# Patient Record
Sex: FEMALE | Race: BLACK OR AFRICAN AMERICAN | NOT HISPANIC OR LATINO | URBAN - METROPOLITAN AREA
[De-identification: names, ages, dates, MRNs, and addresses within clinical notes are randomized per-mention and may not be internally consistent; named-entity substitution may affect disease eponyms.]

---

## 2019-10-03 ENCOUNTER — EMERGENCY (EMERGENCY)
Facility: HOSPITAL | Age: 54
LOS: 0 days | Discharge: HOME | End: 2019-10-03
Attending: EMERGENCY MEDICINE
Payer: COMMERCIAL

## 2019-10-03 VITALS
HEART RATE: 61 BPM | SYSTOLIC BLOOD PRESSURE: 147 MMHG | TEMPERATURE: 99 F | OXYGEN SATURATION: 100 % | WEIGHT: 201.06 LBS | RESPIRATION RATE: 20 BRPM | HEIGHT: 65 IN | DIASTOLIC BLOOD PRESSURE: 100 MMHG

## 2019-10-03 DIAGNOSIS — M54.5 LOW BACK PAIN: ICD-10-CM

## 2019-10-03 DIAGNOSIS — S39.012A STRAIN OF MUSCLE, FASCIA AND TENDON OF LOWER BACK, INITIAL ENCOUNTER: ICD-10-CM
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
## 2019-10-03 LAB — HCG UR QL: NEGATIVE — SIGNIFICANT CHANGE UP

## 2019-10-03 PROCEDURE — 99284 EMERGENCY DEPT VISIT MOD MDM: CPT

## 2019-10-03 PROCEDURE — 72070 X-RAY EXAM THORAC SPINE 2VWS: CPT | Mod: 26

## 2019-10-03 PROCEDURE — 93010 ELECTROCARDIOGRAM REPORT: CPT

## 2019-10-03 PROCEDURE — 72100 X-RAY EXAM L-S SPINE 2/3 VWS: CPT | Mod: 26

## 2019-10-03 RX ORDER — CYCLOBENZAPRINE HYDROCHLORIDE 10 MG/1
1 TABLET, FILM COATED ORAL
Qty: 15 | Refills: 0
Start: 2019-10-03 | End: 2019-10-07

## 2019-10-03 RX ORDER — OXYCODONE AND ACETAMINOPHEN 5; 325 MG/1; MG/1
1 TABLET ORAL ONCE
Refills: 0 | Status: DISCONTINUED | OUTPATIENT
Start: 2019-10-03 | End: 2019-10-03

## 2019-10-03 RX ORDER — IBUPROFEN 200 MG
1 TABLET ORAL
Qty: 15 | Refills: 0
Start: 2019-10-03 | End: 2019-10-07

## 2019-10-03 RX ADMIN — OXYCODONE AND ACETAMINOPHEN 1 TABLET(S): 5; 325 TABLET ORAL at 10:47

## 2019-10-03 RX ADMIN — OXYCODONE AND ACETAMINOPHEN 1 TABLET(S): 5; 325 TABLET ORAL at 10:46

## 2019-10-03 NOTE — ED PROVIDER NOTE - PATIENT PORTAL LINK FT
You can access the FollowMyHealth Patient Portal offered by [facility] by registering at the following website: [email or web address]. By joining [employer]’s FollowMyHealth portal, you will also be able to view your health information using other applications (apps) compatible with our system.
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
## 2019-10-03 NOTE — ED PROVIDER NOTE - CONSTITUTIONAL, MLM
normal... Well appearing, well nourished, awake, alert, oriented to person, place, time/situation and in no apparent distress. Speaking in clear full sentences no nasal flaring no shoulders retractions no diaphoresis not holding her head/chest/abdomen, appears comfortable sitting up in the stretcher in a bright light room.

## 2019-10-03 NOTE — ED PROVIDER NOTE - PROGRESS NOTE DETAILS
Pt 's daughter is here to  pt. Pt sts she is feeling much better denies headache, sob, chest pain, nausea, vomiting, abd pain Pt 's daughter is here to  pt. Pt sts she is feeling much better denies headache, sob, chest pain, nausea, vomiting, abd pain. xray of thoracic and lumbar spines no acute fx. Pt is referred to spinal doctor for further management.

## 2019-10-03 NOTE — ED PROVIDER NOTE - NONTENDER LOCATION
umbilical/right costovertebral angle/left upper quadrant/right upper quadrant/suprapubic/left lower quadrant/right lower quadrant/periumbilical/left costovertebral angle

## 2019-10-03 NOTE — ED ADULT NURSE NOTE - NSIMPLEMENTINTERV_GEN_ALL_ED
Implemented All Universal Safety Interventions:  [place] to call system. Call bell, personal items and telephone within reach. Instruct patient to call for assistance. Room bathroom lighting operational. Non-slip footwear when patient is off stretcher. Physically safe environment: no spills, clutter or unnecessary equipment. Stretcher in lowest position, wheels locked, appropriate side rails in place.
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
## 2019-10-03 NOTE — ED ADULT TRIAGE NOTE - STATUS:
Telephone Encounter by Saman Bedolla MD at 01/16/18 03:21 PM     Author:  Saman Bedolla MD Service:  (none) Author Type:  Physician     Filed:  01/16/18 03:21 PM Encounter Date:  1/15/2018 Status:  Signed     :  Saman Bedolla MD (Physician)            Have her come in at 940 on Saturday[SS1.1M]      Revision History        User Key Date/Time User Provider Type Action    > SS1.1 01/16/18 03:21 PM Saman Bedolla MD Physician Sign    M - Manual             Applied
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
## 2019-10-25 ENCOUNTER — EMERGENCY (EMERGENCY)
Facility: HOSPITAL | Age: 54
LOS: 0 days | Discharge: HOME | End: 2019-10-25
Payer: COMMERCIAL

## 2019-10-25 VITALS
DIASTOLIC BLOOD PRESSURE: 87 MMHG | HEART RATE: 57 BPM | OXYGEN SATURATION: 98 % | RESPIRATION RATE: 17 BRPM | TEMPERATURE: 97 F | SYSTOLIC BLOOD PRESSURE: 137 MMHG

## 2019-10-25 VITALS
RESPIRATION RATE: 20 BRPM | WEIGHT: 190.04 LBS | TEMPERATURE: 99 F | SYSTOLIC BLOOD PRESSURE: 156 MMHG | HEART RATE: 69 BPM | HEIGHT: 66 IN | OXYGEN SATURATION: 100 % | DIASTOLIC BLOOD PRESSURE: 102 MMHG
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
## 2019-10-25 DIAGNOSIS — M25.562 PAIN IN LEFT KNEE: ICD-10-CM
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
## 2019-10-25 DIAGNOSIS — M54.5 LOW BACK PAIN: ICD-10-CM

## 2019-10-25 DIAGNOSIS — S16.1XXA STRAIN OF MUSCLE, FASCIA AND TENDON AT NECK LEVEL, INITIAL ENCOUNTER: ICD-10-CM

## 2019-10-25 DIAGNOSIS — M54.2 CERVICALGIA: ICD-10-CM

## 2019-10-25 DIAGNOSIS — M25.561 PAIN IN RIGHT KNEE: ICD-10-CM

## 2019-10-25 PROBLEM — Z78.9 OTHER SPECIFIED HEALTH STATUS: Chronic | Status: ACTIVE | Noted: 2019-10-03

## 2019-10-25 LAB
ALBUMIN SERPL ELPH-MCNC: 3.6 G/DL — SIGNIFICANT CHANGE UP (ref 3.3–5)
ALP SERPL-CCNC: 79 U/L — SIGNIFICANT CHANGE UP (ref 40–120)
ALT FLD-CCNC: 35 U/L — SIGNIFICANT CHANGE UP (ref 12–78)
ANION GAP SERPL CALC-SCNC: 5 MMOL/L — SIGNIFICANT CHANGE UP (ref 5–17)
AST SERPL-CCNC: 17 U/L — SIGNIFICANT CHANGE UP (ref 15–37)
BASOPHILS # BLD AUTO: 0.03 K/UL — SIGNIFICANT CHANGE UP (ref 0–0.2)
BASOPHILS NFR BLD AUTO: 0.6 % — SIGNIFICANT CHANGE UP (ref 0–2)
BILIRUB SERPL-MCNC: 0.4 MG/DL — SIGNIFICANT CHANGE UP (ref 0.2–1.2)
BUN SERPL-MCNC: 15 MG/DL — SIGNIFICANT CHANGE UP (ref 7–23)
CALCIUM SERPL-MCNC: 8.9 MG/DL — SIGNIFICANT CHANGE UP (ref 8.5–10.1)
CHLORIDE SERPL-SCNC: 107 MMOL/L — SIGNIFICANT CHANGE UP (ref 96–108)
CO2 SERPL-SCNC: 29 MMOL/L — SIGNIFICANT CHANGE UP (ref 22–31)
CREAT SERPL-MCNC: 0.89 MG/DL — SIGNIFICANT CHANGE UP (ref 0.5–1.3)
EOSINOPHIL # BLD AUTO: 0.15 K/UL — SIGNIFICANT CHANGE UP (ref 0–0.5)
EOSINOPHIL NFR BLD AUTO: 3.2 % — SIGNIFICANT CHANGE UP (ref 0–6)
GLUCOSE SERPL-MCNC: 111 MG/DL — HIGH (ref 70–99)
HCT VFR BLD CALC: 36.2 % — SIGNIFICANT CHANGE UP (ref 34.5–45)
HGB BLD-MCNC: 11.4 G/DL — LOW (ref 11.5–15.5)
IMM GRANULOCYTES NFR BLD AUTO: 0.6 % — SIGNIFICANT CHANGE UP (ref 0–1.5)
LYMPHOCYTES # BLD AUTO: 1.94 K/UL — SIGNIFICANT CHANGE UP (ref 1–3.3)
LYMPHOCYTES # BLD AUTO: 41.9 % — SIGNIFICANT CHANGE UP (ref 13–44)
MCHC RBC-ENTMCNC: 26.6 PG — LOW (ref 27–34)
MCHC RBC-ENTMCNC: 31.5 GM/DL — LOW (ref 32–36)
MCV RBC AUTO: 84.4 FL — SIGNIFICANT CHANGE UP (ref 80–100)
MONOCYTES # BLD AUTO: 0.44 K/UL — SIGNIFICANT CHANGE UP (ref 0–0.9)
MONOCYTES NFR BLD AUTO: 9.5 % — SIGNIFICANT CHANGE UP (ref 2–14)
NEUTROPHILS # BLD AUTO: 2.04 K/UL — SIGNIFICANT CHANGE UP (ref 1.8–7.4)
NEUTROPHILS NFR BLD AUTO: 44.2 % — SIGNIFICANT CHANGE UP (ref 43–77)
NRBC # BLD: 0 /100 WBCS — SIGNIFICANT CHANGE UP (ref 0–0)
PLATELET # BLD AUTO: 253 K/UL — SIGNIFICANT CHANGE UP (ref 150–400)
POTASSIUM SERPL-MCNC: 3.5 MMOL/L — SIGNIFICANT CHANGE UP (ref 3.5–5.3)
POTASSIUM SERPL-SCNC: 3.5 MMOL/L — SIGNIFICANT CHANGE UP (ref 3.5–5.3)
PROT SERPL-MCNC: 7.3 GM/DL — SIGNIFICANT CHANGE UP (ref 6–8.3)
RBC # BLD: 4.29 M/UL — SIGNIFICANT CHANGE UP (ref 3.8–5.2)
RBC # FLD: 14.6 % — HIGH (ref 10.3–14.5)
SODIUM SERPL-SCNC: 141 MMOL/L — SIGNIFICANT CHANGE UP (ref 135–145)
TROPONIN I SERPL-MCNC: <.015 NG/ML — SIGNIFICANT CHANGE UP (ref 0.01–0.04)
TROPONIN I SERPL-MCNC: <.015 NG/ML — SIGNIFICANT CHANGE UP (ref 0.01–0.04)
WBC # BLD: 4.63 K/UL — SIGNIFICANT CHANGE UP (ref 3.8–10.5)
WBC # FLD AUTO: 4.63 K/UL — SIGNIFICANT CHANGE UP (ref 3.8–10.5)

## 2019-10-25 PROCEDURE — 71046 X-RAY EXAM CHEST 2 VIEWS: CPT | Mod: 26

## 2019-10-25 PROCEDURE — 73562 X-RAY EXAM OF KNEE 3: CPT | Mod: 26,50

## 2019-10-25 PROCEDURE — 72170 X-RAY EXAM OF PELVIS: CPT | Mod: 26

## 2019-10-25 PROCEDURE — 72125 CT NECK SPINE W/O DYE: CPT | Mod: 26

## 2019-10-25 PROCEDURE — 76377 3D RENDER W/INTRP POSTPROCES: CPT | Mod: 26

## 2019-10-25 PROCEDURE — 70450 CT HEAD/BRAIN W/O DYE: CPT | Mod: 26

## 2019-10-25 PROCEDURE — 93010 ELECTROCARDIOGRAM REPORT: CPT

## 2019-10-25 PROCEDURE — 99285 EMERGENCY DEPT VISIT HI MDM: CPT

## 2019-10-25 RX ORDER — IBUPROFEN 200 MG
600 TABLET ORAL ONCE
Refills: 0 | Status: COMPLETED | OUTPATIENT
Start: 2019-10-25 | End: 2019-10-25

## 2019-10-25 RX ORDER — TIZANIDINE 4 MG/1
1 TABLET ORAL
Qty: 18 | Refills: 0
Start: 2019-10-25 | End: 2019-10-30

## 2019-10-25 RX ORDER — ACETAMINOPHEN 500 MG
975 TABLET ORAL ONCE
Refills: 0 | Status: COMPLETED | OUTPATIENT
Start: 2019-10-25 | End: 2019-10-25

## 2019-10-25 RX ORDER — METHOCARBAMOL 500 MG/1
1000 TABLET, FILM COATED ORAL ONCE
Refills: 0 | Status: COMPLETED | OUTPATIENT
Start: 2019-10-25 | End: 2019-10-25

## 2019-10-25 RX ADMIN — Medication 975 MILLIGRAM(S): at 14:50

## 2019-10-25 RX ADMIN — Medication 975 MILLIGRAM(S): at 14:21

## 2019-10-25 RX ADMIN — Medication 600 MILLIGRAM(S): at 18:35

## 2019-10-25 NOTE — ED PROVIDER NOTE - PHYSICAL EXAMINATION
Physical Exam    Vital Signs: I have reviewed the initial vital signs.  Constitutional: well-nourished, appears stated age, no acute distress  Eyes: PERRLA,  and symmetrical lids.  ENT: Neck supple with no adenopathy, moist MM.  Cardiovascular: regular rate, regular rhythm, well-perfused extremities, no chest wall bruising or ttp  Respiratory: unlabored respiratory effort, clear to auscultation bilaterally  Gastrointestinal: soft, non-tender abdomen, no pulsatile mass, no abd bruising or seatbelt signs or ttp  Musculoskeletal: A&OX3. No visible bruises or distracting injuries. No midline ttp; TTP; full rotation, flexion, and extension of the neck. Good radial pulses +2 b/l, no sesnory or motor deficit in the UE.  Integumentary: warm, dry, no rash  Neurologic: awake, alert, cranial nerves II-XII grossly intact, extremities’ motor and sensory functions grossly intact, no ataxia or dysemtria  Psychiatric: A&Ox3
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
## 2019-10-25 NOTE — ED PROVIDER NOTE - NS ED ROS FT
Review of Systems    Constitutional: (-) fever  Eyes/ENT: (-) change in vision, (-) sore throat, (-) ear pain  Cardiovascular: (-) palpitation   Respiratory: (-) cough, (-) shortness of breath  Gastrointestinal: (-) abdominal pain (-) vomiting, (-) diarrhea  Musculoskeletal: (-) neck pain, (-) back pain, (-) joint pain  Integumentary: (-) rash, (-) edema  Neurological: (-) headache, (-) altered mental status  Heme/Lymph: (-) easy bruising (-) easy bleeding  Allergic/Immunologic: (-) pruritus

## 2019-10-25 NOTE — ED PROVIDER NOTE - CARE PROVIDER_API CALL
Chandrakant Hager)  Cardiology; Internal Medicine; Nuclear Cardiology  [address] [place] [address]  Phone: [phone]  Fax: [phone]  Follow Up Time:

## 2019-10-25 NOTE — ED PROVIDER NOTE - PROGRESS NOTE DETAILS
Pt and family aware of results and CXR findings. Pt CT (-), C-colar cleared. Pt is chest pain free ready for discharge will follow up with cardiology, pt family aware of the results.

## 2019-10-25 NOTE — ED PROVIDER NOTE - PATIENT PORTAL LINK FT
You can access the FollowMyHealth Patient Portal offered by [facility] by registering at the following website: [email or web address]. By joining [employer]’s FollowMyHealth portal, you will also be able to view your health information using other applications (apps) compatible with our system.

## 2019-10-25 NOTE — ED PROVIDER NOTE - CLINICAL SUMMARY MEDICAL DECISION MAKING FREE TEXT BOX
Pt pw neck pain with a negative CT, C spine cleared, tropx2 neg, HEART SCORE 1. Symptoms releived with tylenol and NSAIDs. Pt ready for discharge will follow up with cardiology.
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
## 2021-08-22 ENCOUNTER — TRANSCRIPTION ENCOUNTER (OUTPATIENT)
Age: 56
End: 2021-08-22

## 2024-03-14 ENCOUNTER — INPATIENT (INPATIENT)
Facility: HOSPITAL | Age: 59
LOS: 10 days | Discharge: NURSING, REHAB OR LONG-TERM CARE | End: 2024-03-25
Attending: STUDENT IN AN ORGANIZED HEALTH CARE EDUCATION/TRAINING PROGRAM | Admitting: STUDENT IN AN ORGANIZED HEALTH CARE EDUCATION/TRAINING PROGRAM
Payer: COMMERCIAL

## 2024-03-14 VITALS
DIASTOLIC BLOOD PRESSURE: 60 MMHG | OXYGEN SATURATION: 100 % | RESPIRATION RATE: 16 BRPM | HEART RATE: 91 BPM | TEMPERATURE: 98 F | SYSTOLIC BLOOD PRESSURE: 90 MMHG

## 2024-03-14 PROCEDURE — 99285 EMERGENCY DEPT VISIT HI MDM: CPT

## 2024-03-14 RX ORDER — SODIUM CHLORIDE 9 MG/ML
1000 INJECTION INTRAMUSCULAR; INTRAVENOUS; SUBCUTANEOUS ONCE
Refills: 0 | Status: COMPLETED | OUTPATIENT
Start: 2024-03-14 | End: 2024-03-14

## 2024-03-14 RX ORDER — ONDANSETRON 8 MG/1
4 TABLET, FILM COATED ORAL ONCE
Refills: 0 | Status: COMPLETED | OUTPATIENT
Start: 2024-03-14 | End: 2024-03-14

## 2024-03-14 RX ADMIN — SODIUM CHLORIDE 1000 MILLILITER(S): 9 INJECTION INTRAMUSCULAR; INTRAVENOUS; SUBCUTANEOUS at 23:35

## 2024-03-14 RX ADMIN — ONDANSETRON 4 MILLIGRAM(S): 8 TABLET, FILM COATED ORAL at 23:34

## 2024-03-14 NOTE — ED PROVIDER NOTE - PROGRESS NOTE DETAILS
Sarthak, PGY3 - Received signout on patient.  58-year-old woman with PMH spinal cord injury that has resulted in paraplegia, presenting due to nausea and vomiting and unknown abdominal pain due to patient not being able to feel sensation at this level.  Has also been feeling weak, new left leg swelling despite being on Lovenox.  Waiting on labs, CAT scan, ultrasound of her leg, disposition to be reassessed after workup complete. Sarthak, PGY3 - Lab called due to hemoglobin being low however they state that the sample appears to be diluted.  Will resend another sample. Patient signed out to me, complaining of some neck pain, no midline tenderness to palpation at this time.  Able to range the neck, no meningismus.  Patient denies any black stools or bloody stools, but pt states that she has needed transfusions in the past. Jaison Jaime, ED Attending Sarthak, PGY3 - Blood transfusion benefits, risks, and alternatives explained to the patient and daughter at bedside, patient is in agreement. Consent form has been signed, witnessed, and placed in chart. prbcs ordered, will consult surgery due to concern for thigh hematoma Spoke to surgery, not a surgical candidate, recommended further imaging to assess for active bleed.  But patient will likely need IR intervention if active bleed seen.  Patient hemodynamically stable.  Patient and patient's daughter updated on all findings.  Plan for CT angio of the abdomen pelvis with runoffs to the thigh to assess for possible source of hematoma. Jaison Jaime, ED Attending

## 2024-03-14 NOTE — ED PROVIDER NOTE - CARE PLAN
1 Principal Discharge DX:	Anemia due to acute blood loss  Secondary Diagnosis:	Intramuscular hematoma

## 2024-03-14 NOTE — ED PROVIDER NOTE - OBJECTIVE STATEMENT
58 year old female with recent spinal cord injury dec 23, presents with 1 day of vomiting. patient unable to tell if she has abd pain 2/2 injury. also complains of being more sleepy than normal and having left leg swelling that is new. has history of thrombectomy in left leg and is on daily lovenox. patient also suffers from autonomic dysreflexia.  no fevers. patient can not tell if she has dysuria.

## 2024-03-14 NOTE — ED ADULT TRIAGE NOTE - CHIEF COMPLAINT QUOTE
PT c/o weakness, nausea, vomiting x 1 day. pt states she hasn't been able to keep anything down. Pt is bed bound due to a spinal cord inj, unable to move extremities. . Pt has a healing sacral ulcer. pt noted to be hypotensive. No complaints of chest pain, headache, dizziness, SOB, fever, chills verbalized..

## 2024-03-14 NOTE — ED ADULT NURSE NOTE - OBJECTIVE STATEMENT
Patient received in ED room 2. A&Ox4 and bedbound at baseline secondary to spinal cord injury. C/o fatigue, decreased PO intake, nausea and vomiting x 1 day. Pt appears lethargic in stretcher. Placed on bedside cardiac monitor NSR. O2 saturation is 99% on room air. Soft Blood Pressure noted and charted in flowsheet, MD Muniz made aware. Respirations even and unlabored with equal chest rise. No acute distress noted. Speaking in full and complete sentences. MD Muniz made aware of USIV needed after two RN attempts. Daughter at bedside. Bed in lowest position, call bell in reach, wheels locked, appropriate side rails up, fall precautions in effect, safety maintained. Awaiting US and CT. Patient received in ED room 2. A&Ox4 and bedbound at baseline secondary to spinal cord injury. C/o fatigue, decreased PO intake, nausea and vomiting x 1 day. Pt appears lethargic in stretcher. Placed on bedside cardiac monitor NSR. O2 saturation is 99% on room air. Soft Blood Pressure noted and charted in flowsheet, MD Muniz made aware. Respirations even and unlabored with equal chest rise. No acute distress noted. Speaking in full and complete sentences. Stage 1 sacral ulcer noted. MD Muniz made aware of USIV needed after two RN attempts. Daughter at bedside. Bed in lowest position, call bell in reach, wheels locked, appropriate side rails up, fall precautions in effect, safety maintained. Awaiting US and CT.

## 2024-03-14 NOTE — ED PROVIDER NOTE - CLINICAL SUMMARY MEDICAL DECISION MAKING FREE TEXT BOX
Will obtain cbc to rule out anemia. Will obtain CMP to rule out electrolyte abnormalities, liver failure or renal failure. fluids. anti emetics. ct for obstruction given patient unable to tell if she has belly pain. ua for uti. left leg duplex for dvt. ct head for ich or subdural. reassess

## 2024-03-14 NOTE — ED ADULT NURSE NOTE - NSFALLHARMRISKINTERV_ED_ALL_ED
Assistance OOB with selected safe patient handling equipment if applicable/Assistance with ambulation/Communicate risk of Fall with Harm to all staff, patient, and family/Monitor gait and stability/Provide visual cue: red socks, yellow wristband, yellow gown, etc/Reinforce activity limits and safety measures with patient and family/Toileting schedule using arm’s reach rule for commode and bathroom/Bed in lowest position, wheels locked, appropriate side rails in place/Call bell, personal items and telephone in reach/Instruct patient to call for assistance before getting out of bed/chair/stretcher/Non-slip footwear applied when patient is off stretcher/[place] to call system/Physically safe environment - no spills, clutter or unnecessary equipment/Purposeful Proactive Rounding/Room/bathroom lighting operational, light cord in reach

## 2024-03-15 DIAGNOSIS — D62 ACUTE POSTHEMORRHAGIC ANEMIA: ICD-10-CM

## 2024-03-15 DIAGNOSIS — G82.20 PARAPLEGIA, UNSPECIFIED: ICD-10-CM

## 2024-03-15 DIAGNOSIS — T14.8XXA OTHER INJURY OF UNSPECIFIED BODY REGION, INITIAL ENCOUNTER: ICD-10-CM

## 2024-03-15 DIAGNOSIS — I82.409 ACUTE EMBOLISM AND THROMBOSIS OF UNSPECIFIED DEEP VEINS OF UNSPECIFIED LOWER EXTREMITY: ICD-10-CM

## 2024-03-15 DIAGNOSIS — D50.0 IRON DEFICIENCY ANEMIA SECONDARY TO BLOOD LOSS (CHRONIC): ICD-10-CM

## 2024-03-15 DIAGNOSIS — Z86.718 PERSONAL HISTORY OF OTHER VENOUS THROMBOSIS AND EMBOLISM: ICD-10-CM

## 2024-03-15 DIAGNOSIS — Z29.9 ENCOUNTER FOR PROPHYLACTIC MEASURES, UNSPECIFIED: ICD-10-CM

## 2024-03-15 LAB
A1C WITH ESTIMATED AVERAGE GLUCOSE RESULT: 5.2 % — SIGNIFICANT CHANGE UP (ref 4–5.6)
A1C WITH ESTIMATED AVERAGE GLUCOSE RESULT: 5.3 % — SIGNIFICANT CHANGE UP (ref 4–5.6)
ADD ON TEST-SPECIMEN IN LAB: SIGNIFICANT CHANGE UP
ALBUMIN SERPL ELPH-MCNC: 3.2 G/DL — LOW (ref 3.3–5)
ALBUMIN SERPL ELPH-MCNC: 3.5 G/DL — SIGNIFICANT CHANGE UP (ref 3.3–5)
ALP SERPL-CCNC: 58 U/L — SIGNIFICANT CHANGE UP (ref 40–120)
ALP SERPL-CCNC: 64 U/L — SIGNIFICANT CHANGE UP (ref 40–120)
ALT FLD-CCNC: 5 U/L — SIGNIFICANT CHANGE UP (ref 4–33)
ALT FLD-CCNC: 9 U/L — SIGNIFICANT CHANGE UP (ref 4–33)
ANION GAP SERPL CALC-SCNC: 13 MMOL/L — SIGNIFICANT CHANGE UP (ref 7–14)
ANION GAP SERPL CALC-SCNC: 14 MMOL/L — SIGNIFICANT CHANGE UP (ref 7–14)
ANION GAP SERPL CALC-SCNC: 15 MMOL/L — HIGH (ref 7–14)
APPEARANCE UR: CLEAR — SIGNIFICANT CHANGE UP
APTT BLD: 33.8 SEC — SIGNIFICANT CHANGE UP (ref 24.5–35.6)
AST SERPL-CCNC: 11 U/L — SIGNIFICANT CHANGE UP (ref 4–32)
AST SERPL-CCNC: 14 U/L — SIGNIFICANT CHANGE UP (ref 4–32)
BASE EXCESS BLDV CALC-SCNC: -2.9 MMOL/L — LOW (ref -2–3)
BASOPHILS # BLD AUTO: 0.01 K/UL — SIGNIFICANT CHANGE UP (ref 0–0.2)
BASOPHILS NFR BLD AUTO: 0.1 % — SIGNIFICANT CHANGE UP (ref 0–2)
BILIRUB SERPL-MCNC: 0.4 MG/DL — SIGNIFICANT CHANGE UP (ref 0.2–1.2)
BILIRUB SERPL-MCNC: 0.5 MG/DL — SIGNIFICANT CHANGE UP (ref 0.2–1.2)
BILIRUB UR-MCNC: NEGATIVE — SIGNIFICANT CHANGE UP
BLD GP AB SCN SERPL QL: NEGATIVE — SIGNIFICANT CHANGE UP
BLOOD GAS VENOUS COMPREHENSIVE RESULT: SIGNIFICANT CHANGE UP
BLOOD GAS VENOUS COMPREHENSIVE RESULT: SIGNIFICANT CHANGE UP
BUN SERPL-MCNC: 51 MG/DL — HIGH (ref 7–23)
BUN SERPL-MCNC: 58 MG/DL — HIGH (ref 7–23)
BUN SERPL-MCNC: 60 MG/DL — HIGH (ref 7–23)
CALCIUM SERPL-MCNC: 8.5 MG/DL — SIGNIFICANT CHANGE UP (ref 8.4–10.5)
CALCIUM SERPL-MCNC: 8.8 MG/DL — SIGNIFICANT CHANGE UP (ref 8.4–10.5)
CALCIUM SERPL-MCNC: 9.2 MG/DL — SIGNIFICANT CHANGE UP (ref 8.4–10.5)
CHLORIDE BLDV-SCNC: 101 MMOL/L — SIGNIFICANT CHANGE UP (ref 96–108)
CHLORIDE SERPL-SCNC: 91 MMOL/L — LOW (ref 98–107)
CHLORIDE SERPL-SCNC: 94 MMOL/L — LOW (ref 98–107)
CHLORIDE SERPL-SCNC: 94 MMOL/L — LOW (ref 98–107)
CO2 BLDV-SCNC: 23.9 MMOL/L — SIGNIFICANT CHANGE UP (ref 22–26)
CO2 SERPL-SCNC: 20 MMOL/L — LOW (ref 22–31)
CO2 SERPL-SCNC: 21 MMOL/L — LOW (ref 22–31)
CO2 SERPL-SCNC: 23 MMOL/L — SIGNIFICANT CHANGE UP (ref 22–31)
COLOR SPEC: YELLOW — SIGNIFICANT CHANGE UP
CREAT SERPL-MCNC: 0.98 MG/DL — SIGNIFICANT CHANGE UP (ref 0.5–1.3)
CREAT SERPL-MCNC: 1.1 MG/DL — SIGNIFICANT CHANGE UP (ref 0.5–1.3)
CREAT SERPL-MCNC: 1.14 MG/DL — SIGNIFICANT CHANGE UP (ref 0.5–1.3)
DIFF PNL FLD: NEGATIVE — SIGNIFICANT CHANGE UP
EGFR: 56 ML/MIN/1.73M2 — LOW
EGFR: 59 ML/MIN/1.73M2 — LOW
EGFR: 67 ML/MIN/1.73M2 — SIGNIFICANT CHANGE UP
EOSINOPHIL # BLD AUTO: 0.01 K/UL — SIGNIFICANT CHANGE UP (ref 0–0.5)
EOSINOPHIL NFR BLD AUTO: 0.1 % — SIGNIFICANT CHANGE UP (ref 0–6)
ESTIMATED AVERAGE GLUCOSE: 103 — SIGNIFICANT CHANGE UP
ESTIMATED AVERAGE GLUCOSE: 105 — SIGNIFICANT CHANGE UP
GAS PNL BLDV: 129 MMOL/L — LOW (ref 136–145)
GLUCOSE BLDC GLUCOMTR-MCNC: 167 MG/DL — HIGH (ref 70–99)
GLUCOSE BLDC GLUCOMTR-MCNC: 168 MG/DL — HIGH (ref 70–99)
GLUCOSE BLDC GLUCOMTR-MCNC: 168 MG/DL — HIGH (ref 70–99)
GLUCOSE BLDV-MCNC: 170 MG/DL — HIGH (ref 70–99)
GLUCOSE SERPL-MCNC: 151 MG/DL — HIGH (ref 70–99)
GLUCOSE SERPL-MCNC: 180 MG/DL — HIGH (ref 70–99)
GLUCOSE SERPL-MCNC: 182 MG/DL — HIGH (ref 70–99)
GLUCOSE UR QL: NEGATIVE MG/DL — SIGNIFICANT CHANGE UP
HCO3 BLDV-SCNC: 23 MMOL/L — SIGNIFICANT CHANGE UP (ref 22–29)
HCT VFR BLD CALC: 10.3 % — CRITICAL LOW (ref 34.5–45)
HCT VFR BLD CALC: 18.5 % — CRITICAL LOW (ref 34.5–45)
HCT VFR BLD CALC: 19 % — CRITICAL LOW (ref 34.5–45)
HCT VFR BLDA CALC: SIGNIFICANT CHANGE UP % (ref 34.5–46.5)
HGB BLD CALC-MCNC: < 4 G/DL — SIGNIFICANT CHANGE UP (ref 11.7–16.1)
HGB BLD-MCNC: 3.2 G/DL — CRITICAL LOW (ref 11.5–15.5)
HGB BLD-MCNC: 6.3 G/DL — CRITICAL LOW (ref 11.5–15.5)
HGB BLD-MCNC: 6.5 G/DL — CRITICAL LOW (ref 11.5–15.5)
IANC: 7.4 K/UL — SIGNIFICANT CHANGE UP (ref 1.8–7.4)
IMM GRANULOCYTES NFR BLD AUTO: 2.1 % — HIGH (ref 0–0.9)
INR BLD: 1.21 RATIO — HIGH (ref 0.85–1.18)
KETONES UR-MCNC: NEGATIVE MG/DL — SIGNIFICANT CHANGE UP
LACTATE BLDV-MCNC: 1.6 MMOL/L — SIGNIFICANT CHANGE UP (ref 0.5–2)
LEUKOCYTE ESTERASE UR-ACNC: ABNORMAL
LIDOCAIN IGE QN: 16 U/L — SIGNIFICANT CHANGE UP (ref 7–60)
LYMPHOCYTES # BLD AUTO: 1.86 K/UL — SIGNIFICANT CHANGE UP (ref 1–3.3)
LYMPHOCYTES # BLD AUTO: 17.4 % — SIGNIFICANT CHANGE UP (ref 13–44)
MAGNESIUM SERPL-MCNC: 2.3 MG/DL — SIGNIFICANT CHANGE UP (ref 1.6–2.6)
MCHC RBC-ENTMCNC: 27.1 PG — SIGNIFICANT CHANGE UP (ref 27–34)
MCHC RBC-ENTMCNC: 27.6 PG — SIGNIFICANT CHANGE UP (ref 27–34)
MCHC RBC-ENTMCNC: 29 PG — SIGNIFICANT CHANGE UP (ref 27–34)
MCHC RBC-ENTMCNC: 31.1 GM/DL — LOW (ref 32–36)
MCHC RBC-ENTMCNC: 33.2 GM/DL — SIGNIFICANT CHANGE UP (ref 32–36)
MCHC RBC-ENTMCNC: 35.1 GM/DL — SIGNIFICANT CHANGE UP (ref 32–36)
MCV RBC AUTO: 82.6 FL — SIGNIFICANT CHANGE UP (ref 80–100)
MCV RBC AUTO: 83.3 FL — SIGNIFICANT CHANGE UP (ref 80–100)
MCV RBC AUTO: 87.3 FL — SIGNIFICANT CHANGE UP (ref 80–100)
MONOCYTES # BLD AUTO: 1.19 K/UL — HIGH (ref 0–0.9)
MONOCYTES NFR BLD AUTO: 11.1 % — SIGNIFICANT CHANGE UP (ref 2–14)
NEUTROPHILS # BLD AUTO: 7.4 K/UL — SIGNIFICANT CHANGE UP (ref 1.8–7.4)
NEUTROPHILS NFR BLD AUTO: 69.2 % — SIGNIFICANT CHANGE UP (ref 43–77)
NITRITE UR-MCNC: NEGATIVE — SIGNIFICANT CHANGE UP
NRBC # BLD: 0 /100 WBCS — SIGNIFICANT CHANGE UP (ref 0–0)
NRBC # FLD: 0.03 K/UL — HIGH (ref 0–0)
NRBC # FLD: 0.05 K/UL — HIGH (ref 0–0)
NRBC # FLD: 0.09 K/UL — HIGH (ref 0–0)
PCO2 BLDV: 41 MMHG — SIGNIFICANT CHANGE UP (ref 39–52)
PH BLDV: 7.35 — SIGNIFICANT CHANGE UP (ref 7.32–7.43)
PH UR: 5.5 — SIGNIFICANT CHANGE UP (ref 5–8)
PHOSPHATE SERPL-MCNC: 4.8 MG/DL — HIGH (ref 2.5–4.5)
PLATELET # BLD AUTO: 190 K/UL — SIGNIFICANT CHANGE UP (ref 150–400)
PLATELET # BLD AUTO: 229 K/UL — SIGNIFICANT CHANGE UP (ref 150–400)
PLATELET # BLD AUTO: 252 K/UL — SIGNIFICANT CHANGE UP (ref 150–400)
PO2 BLDV: 36 MMHG — SIGNIFICANT CHANGE UP (ref 25–45)
POTASSIUM BLDV-SCNC: 5.3 MMOL/L — HIGH (ref 3.5–5.1)
POTASSIUM SERPL-MCNC: 4.9 MMOL/L — SIGNIFICANT CHANGE UP (ref 3.5–5.3)
POTASSIUM SERPL-MCNC: 5.3 MMOL/L — SIGNIFICANT CHANGE UP (ref 3.5–5.3)
POTASSIUM SERPL-MCNC: 5.6 MMOL/L — HIGH (ref 3.5–5.3)
POTASSIUM SERPL-SCNC: 4.9 MMOL/L — SIGNIFICANT CHANGE UP (ref 3.5–5.3)
POTASSIUM SERPL-SCNC: 5.3 MMOL/L — SIGNIFICANT CHANGE UP (ref 3.5–5.3)
POTASSIUM SERPL-SCNC: 5.6 MMOL/L — HIGH (ref 3.5–5.3)
PROT SERPL-MCNC: 6.3 G/DL — SIGNIFICANT CHANGE UP (ref 6–8.3)
PROT SERPL-MCNC: 7 G/DL — SIGNIFICANT CHANGE UP (ref 6–8.3)
PROT UR-MCNC: NEGATIVE MG/DL — SIGNIFICANT CHANGE UP
PROTHROM AB SERPL-ACNC: 13.6 SEC — HIGH (ref 9.5–13)
RBC # BLD: 1.18 M/UL — LOW (ref 3.8–5.2)
RBC # BLD: 2.24 M/UL — LOW (ref 3.8–5.2)
RBC # BLD: 2.28 M/UL — LOW (ref 3.8–5.2)
RBC # FLD: 14.7 % — HIGH (ref 10.3–14.5)
RBC # FLD: 14.8 % — HIGH (ref 10.3–14.5)
RBC # FLD: 17.1 % — HIGH (ref 10.3–14.5)
RH IG SCN BLD-IMP: POSITIVE — SIGNIFICANT CHANGE UP
RH IG SCN BLD-IMP: POSITIVE — SIGNIFICANT CHANGE UP
SAO2 % BLDV: 60.8 % — LOW (ref 67–88)
SODIUM SERPL-SCNC: 127 MMOL/L — LOW (ref 135–145)
SODIUM SERPL-SCNC: 128 MMOL/L — LOW (ref 135–145)
SODIUM SERPL-SCNC: 130 MMOL/L — LOW (ref 135–145)
SP GR SPEC: 1.02 — SIGNIFICANT CHANGE UP (ref 1–1.03)
UROBILINOGEN FLD QL: 0.2 MG/DL — SIGNIFICANT CHANGE UP (ref 0.2–1)
WBC # BLD: 10.7 K/UL — HIGH (ref 3.8–10.5)
WBC # BLD: 10.82 K/UL — HIGH (ref 3.8–10.5)
WBC # BLD: 9.55 K/UL — SIGNIFICANT CHANGE UP (ref 3.8–10.5)
WBC # FLD AUTO: 10.7 K/UL — HIGH (ref 3.8–10.5)
WBC # FLD AUTO: 10.82 K/UL — HIGH (ref 3.8–10.5)
WBC # FLD AUTO: 9.55 K/UL — SIGNIFICANT CHANGE UP (ref 3.8–10.5)

## 2024-03-15 PROCEDURE — 99222 1ST HOSP IP/OBS MODERATE 55: CPT | Mod: GC

## 2024-03-15 PROCEDURE — 93970 EXTREMITY STUDY: CPT | Mod: 26

## 2024-03-15 PROCEDURE — 74177 CT ABD & PELVIS W/CONTRAST: CPT | Mod: 26,MC

## 2024-03-15 PROCEDURE — 73706 CT ANGIO LWR EXTR W/O&W/DYE: CPT | Mod: 26,LT,52,MC

## 2024-03-15 PROCEDURE — 70450 CT HEAD/BRAIN W/O DYE: CPT | Mod: 26,MC

## 2024-03-15 PROCEDURE — 99223 1ST HOSP IP/OBS HIGH 75: CPT | Mod: GC

## 2024-03-15 RX ORDER — GABAPENTIN 400 MG/1
300 CAPSULE ORAL ONCE
Refills: 0 | Status: COMPLETED | OUTPATIENT
Start: 2024-03-15 | End: 2024-03-15

## 2024-03-15 RX ORDER — ACETAMINOPHEN 500 MG
650 TABLET ORAL EVERY 6 HOURS
Refills: 0 | Status: DISCONTINUED | OUTPATIENT
Start: 2024-03-15 | End: 2024-03-17

## 2024-03-15 RX ORDER — DEXTROSE 50 % IN WATER 50 %
25 SYRINGE (ML) INTRAVENOUS ONCE
Refills: 0 | Status: DISCONTINUED | OUTPATIENT
Start: 2024-03-15 | End: 2024-03-25

## 2024-03-15 RX ORDER — MORPHINE SULFATE 50 MG/1
2 CAPSULE, EXTENDED RELEASE ORAL ONCE
Refills: 0 | Status: DISCONTINUED | OUTPATIENT
Start: 2024-03-15 | End: 2024-03-16

## 2024-03-15 RX ORDER — FENTANYL CITRATE 50 UG/ML
25 INJECTION INTRAVENOUS ONCE
Refills: 0 | Status: DISCONTINUED | OUTPATIENT
Start: 2024-03-15 | End: 2024-03-15

## 2024-03-15 RX ORDER — CYCLOBENZAPRINE HYDROCHLORIDE 10 MG/1
5 TABLET, FILM COATED ORAL THREE TIMES A DAY
Refills: 0 | Status: DISCONTINUED | OUTPATIENT
Start: 2024-03-15 | End: 2024-03-15

## 2024-03-15 RX ORDER — ACETAMINOPHEN 500 MG
1000 TABLET ORAL ONCE
Refills: 0 | Status: COMPLETED | OUTPATIENT
Start: 2024-03-15 | End: 2024-03-15

## 2024-03-15 RX ORDER — INFLUENZA VIRUS VACCINE 15; 15; 15; 15 UG/.5ML; UG/.5ML; UG/.5ML; UG/.5ML
0.5 SUSPENSION INTRAMUSCULAR ONCE
Refills: 0 | Status: DISCONTINUED | OUTPATIENT
Start: 2024-03-15 | End: 2024-03-25

## 2024-03-15 RX ORDER — GABAPENTIN 400 MG/1
300 CAPSULE ORAL THREE TIMES A DAY
Refills: 0 | Status: DISCONTINUED | OUTPATIENT
Start: 2024-03-15 | End: 2024-03-25

## 2024-03-15 RX ORDER — MORPHINE SULFATE 50 MG/1
2 CAPSULE, EXTENDED RELEASE ORAL ONCE
Refills: 0 | Status: DISCONTINUED | OUTPATIENT
Start: 2024-03-15 | End: 2024-03-15

## 2024-03-15 RX ORDER — INSULIN LISPRO 100/ML
VIAL (ML) SUBCUTANEOUS
Refills: 0 | Status: DISCONTINUED | OUTPATIENT
Start: 2024-03-15 | End: 2024-03-25

## 2024-03-15 RX ORDER — TRAMADOL HYDROCHLORIDE 50 MG/1
50 TABLET ORAL ONCE
Refills: 0 | Status: DISCONTINUED | OUTPATIENT
Start: 2024-03-15 | End: 2024-03-15

## 2024-03-15 RX ORDER — SODIUM CHLORIDE 9 MG/ML
1000 INJECTION, SOLUTION INTRAVENOUS
Refills: 0 | Status: DISCONTINUED | OUTPATIENT
Start: 2024-03-15 | End: 2024-03-25

## 2024-03-15 RX ORDER — DEXTROSE 50 % IN WATER 50 %
12.5 SYRINGE (ML) INTRAVENOUS ONCE
Refills: 0 | Status: DISCONTINUED | OUTPATIENT
Start: 2024-03-15 | End: 2024-03-25

## 2024-03-15 RX ORDER — MORPHINE SULFATE 50 MG/1
4 CAPSULE, EXTENDED RELEASE ORAL ONCE
Refills: 0 | Status: DISCONTINUED | OUTPATIENT
Start: 2024-03-15 | End: 2024-03-15

## 2024-03-15 RX ORDER — GLUCAGON INJECTION, SOLUTION 0.5 MG/.1ML
1 INJECTION, SOLUTION SUBCUTANEOUS ONCE
Refills: 0 | Status: DISCONTINUED | OUTPATIENT
Start: 2024-03-15 | End: 2024-03-25

## 2024-03-15 RX ORDER — DEXTROSE 50 % IN WATER 50 %
15 SYRINGE (ML) INTRAVENOUS ONCE
Refills: 0 | Status: DISCONTINUED | OUTPATIENT
Start: 2024-03-15 | End: 2024-03-25

## 2024-03-15 RX ORDER — LIDOCAINE 4 G/100G
1 CREAM TOPICAL ONCE
Refills: 0 | Status: COMPLETED | OUTPATIENT
Start: 2024-03-15 | End: 2024-03-15

## 2024-03-15 RX ADMIN — Medication 400 MILLIGRAM(S): at 20:36

## 2024-03-15 RX ADMIN — LIDOCAINE 1 PATCH: 4 CREAM TOPICAL at 15:27

## 2024-03-15 RX ADMIN — MORPHINE SULFATE 4 MILLIGRAM(S): 50 CAPSULE, EXTENDED RELEASE ORAL at 11:45

## 2024-03-15 RX ADMIN — FENTANYL CITRATE 25 MICROGRAM(S): 50 INJECTION INTRAVENOUS at 03:41

## 2024-03-15 RX ADMIN — MORPHINE SULFATE 2 MILLIGRAM(S): 50 CAPSULE, EXTENDED RELEASE ORAL at 16:04

## 2024-03-15 RX ADMIN — MORPHINE SULFATE 2 MILLIGRAM(S): 50 CAPSULE, EXTENDED RELEASE ORAL at 16:19

## 2024-03-15 RX ADMIN — GABAPENTIN 300 MILLIGRAM(S): 400 CAPSULE ORAL at 20:36

## 2024-03-15 RX ADMIN — LIDOCAINE 1 PATCH: 4 CREAM TOPICAL at 03:41

## 2024-03-15 RX ADMIN — Medication 400 MILLIGRAM(S): at 01:14

## 2024-03-15 RX ADMIN — Medication 1000 MILLIGRAM(S): at 21:00

## 2024-03-15 NOTE — ED ADULT NURSE REASSESSMENT NOTE - AS PAIN REST
0 (no pain/absence of nonverbal indicators of pain)
0 (no pain/absence of nonverbal indicators of pain)
4 (moderate pain)
8 (severe pain)

## 2024-03-15 NOTE — H&P ADULT - NSHPPHYSICALEXAM_GEN_ALL_CORE
PHYSICAL EXAM:  GENERAL: generalized weakness  HEAD:  Atraumatic, Normocephalic  EYES: EOMI, PERRLA, conjunctiva and sclera clear  ENMT: No tonsillar erythema, exudates, or enlargement; Moist mucous membranes  NECK: Supple, No JVD, Normal thyroid  HEART: Regular rate and rhythm; No murmurs, rubs, or gallops  RESPIRATORY: CTA B/L, No W/R/R  ABDOMEN: Soft, Nontender, Nondistended; Bowel sounds present  NEUROLOGY: A&Ox3, nonfocal, moving all extremities  EXTREMITIES:  LLE swelling; motor deficit L foot, intact sensation  SKIN: warm, dry, normal color, no rash or abnormal lesions

## 2024-03-15 NOTE — H&P ADULT - PROBLEM SELECTOR PLAN 1
LLE Hematoma  - CTA LLE without active bleeding  -  light compression  - hold therapeutic coagulation i/s/o of anemia and IVC filter in place  - no role for surgical intervention on spontaneous hematoma unless concern for compartment syndrome- unable to assess motor and sensation but strong palpable DP pulse, compartments soft - low concern for compartment syndrome; patient remains HD stable

## 2024-03-15 NOTE — PATIENT PROFILE ADULT - FALL HARM RISK - HARM RISK INTERVENTIONS
Assistance with ambulation/Assistance OOB with selected safe patient handling equipment/Communicate Risk of Fall with Harm to all staff/Monitor for mental status changes/Monitor gait and stability/Reinforce activity limits and safety measures with patient and family/Reorient to person, place and time as needed/Review medications for side effects contributing to fall risk/Sit up slowly, dangle for a short time, stand at bedside before walking/Tailored Fall Risk Interventions/Toileting schedule using arm’s reach rule for commode and bathroom/Use of alarms - bed, chair and/or voice tab/Visual Cue: Yellow wristband and red socks/Bed in lowest position, wheels locked, appropriate side rails in place/Call bell, personal items and telephone in reach/Instruct patient to call for assistance before getting out of bed or chair/Non-slip footwear when patient is out of bed/[place] to call system/Physically safe environment - no spills, clutter or unnecessary equipment/Purposeful Proactive Rounding/Room/bathroom lighting operational, light cord in reach

## 2024-03-15 NOTE — PATIENT PROFILE ADULT - FUNCTIONAL ASSESSMENT - BASIC MOBILITY 6.
1-calculated by average/Not able to assess (calculate score using [facility] averaging method)
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
## 2024-03-15 NOTE — H&P ADULT - NSHPLABSRESULTS_GEN_ALL_CORE
LABORATORY DATA                        6.3    10.82 )-----------( 229      ( 15 Mar 2024 09:50 )             19.0     03-15    130<L>  |  94<L>  |  51<H>  ----------------------------<  151<H>  4.9   |  23  |  0.98    Ca    8.8      15 Mar 2024 09:50  Phos  4.8     03-15  Mg     2.30     03-15    TPro  6.3  /  Alb  3.2<L>  /  TBili  0.4  /  DBili  x   /  AST  11  /  ALT  5   /  AlkPhos  58  03-15    PT/INR - ( 15 Mar 2024 01:11 )   PT: 13.6 sec;   INR: 1.21 ratio         PTT - ( 15 Mar 2024 01:11 )  PTT:33.8 sec      Urinalysis Basic - ( 15 Mar 2024 09:50 )    Color: x / Appearance: x / SG: x / pH: x  Gluc: 151 mg/dL / Ketone: x  / Bili: x / Urobili: x   Blood: x / Protein: x / Nitrite: x   Leuk Esterase: x / RBC: x / WBC x   Sq Epi: x / Non Sq Epi: x / Bacteria: x    CAPILLARY BLOOD GLUCOSE      POCT Blood Glucose.: 182 mg/dL (14 Mar 2024 22:05)      IMAGING REVIEW      MICROBIOLOGY REVIEW  < from: CT Angio Lower Extremity w/ IV Cont, Left (03.15.24 @ 06:16) >    IMPRESSION:  Large left medial/posterior thigh compartment intramuscular hematoma. No   evidence of active bleeding.< from: CT Angio Lower Extremity w/ IV Cont, Left (03.15.24 @ 06:16) >    < from: CT Abdomen and Pelvis w/ IV Cont (03.15.24 @ 01:52) >    IMPRESSION:  Marked edema and enlargement of the left posterior/medial musculature of   the proximal left thigh partially visualized, favored to reflect   intramuscular hematoma, with surrounding fat stranding.    Findings were discussed with Dr. De León  3/15/2024 1:59 AM by Dr. Morrissey with   read back confirmation.    Hypoattenuating 1.7 cm hepatic lesion, incompletely characterized on this   study. Consider nonemergent ultrasound or MRI for further assessment if   clinically warranted.    < end of copied text >    < from: US Duplex Venous Lower Ext Complete, Bilateral (03.15.24 @ 01:21) >      IMPRESSION:    Nonocclusive thrombus in the bilateral femoral veins and popliteal veins   which are incompletely compressed.    < end of copied text >    < from: CT Head No Cont (03.15.24 @ 01:51) >    IMPRESSION:  No acute intracranial hemorrhage or mass effect. If clinical symptoms   persist or worsen, more sensitive evaluation with brain MRI may be   obtained, if no contraindications exist.    Nonspecific partialopacification of the right mastoid air cells.    < end of copied text >              CARDIOLOGY REVIEW
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
## 2024-03-15 NOTE — H&P ADULT - PROBLEM SELECTOR PLAN 3
Bilat fem/pop DVT s/p thrombectomies and IVC filter placement  - AC stopped i/s/o hematoma  - high risk due to paraplegia

## 2024-03-15 NOTE — PROVIDER CONTACT NOTE (MEDICATION) - ACTION/TREATMENT ORDERED:
MD made aware. No new interventions at this time.
MD made aware. No new interventions at this time. Check blood glucose again later before dinner.

## 2024-03-15 NOTE — ED ADULT NURSE REASSESSMENT NOTE - PAIN RATING/NUMBER SCALE (0-10): ACTIVITY
0 (no pain/absence of nonverbal indicators of pain)
4 (moderate pain)
8 (severe pain)
0 (no pain/absence of nonverbal indicators of pain)

## 2024-03-15 NOTE — CHART NOTE - NSCHARTNOTEFT_GEN_A_CORE
This report was requested by: Luís Poole | Reference #: [id number]    Practitioner Count: 1  Pharmacy Count: 1  Current Opioid Prescriptions: 0  Current Benzodiazepine Prescriptions: 0  Current Stimulant Prescriptions: 0      Patient Demographic Information (PDI)       PDI	First Name	Last Name	Birth Date	Gender	Street Address	[facility] Code  NOAM Perez	1965	Female	[address] [address]    Prescription Information      PDI Filter:    PDI	Current Rx	Drug Type	Rx Written	Rx Dispensed	Drug	Quantity	Days Supply	Prescriber Name	Prescriber LIYAH #	Payment Method	Dispenser  A	N	O	02/23/2024	02/23/2024	tramadol hcl 50 mg tablet	30	15	Trevor Temple MD	[id number]	[place]	[facility]
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
## 2024-03-15 NOTE — PROVIDER CONTACT NOTE (MEDICATION) - ASSESSMENT
Patient observed in bed. Patient continues to refuse insulin even after being educated on benefits of medication.
Patient observed in bed. Patient refused insulin even after being educated on benefits of medication.
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
## 2024-03-15 NOTE — CONSULT NOTE ADULT - ATTENDING COMMENTS
Pt seen and examined.  Agree with resident eval and plan.  Imp: Spontaneous hematoma left thigh.  Followup CTA of left lower extremity.  Possible IR.

## 2024-03-15 NOTE — H&P ADULT - NSHPREVIEWOFSYSTEMS_GEN_ALL_CORE
REVIEW OF SYSTEMS:  CONSTITUTIONAL: No weakness, fevers or chills  EYES/ENT: No visual changes;  No vertigo or throat pain   NECK: No pain or stiffness  RESPIRATORY: No cough, wheezing, hemoptysis; No shortness of breath  CARDIOVASCULAR: No chest pain or palpitations  GASTROINTESTINAL: No abdominal or epigastric pain. No nausea, vomiting, or hematemesis; No diarrhea or constipation. No melena or hematochezia.  GENITOURINARY: No dysuria, frequency or hematuria  NEUROLOGICAL: No numbness or weakness  SKIN: No itching, rashes  MUSCULOSKELETAL: LLE swelling REVIEW OF SYSTEMS:  CONSTITUTIONAL: weakness, fevers or chills  EYES/ENT: No visual changes;  No vertigo or throat pain   NECK: No pain or stiffness  RESPIRATORY: No cough, wheezing, hemoptysis; No shortness of breath  CARDIOVASCULAR: No chest pain or palpitations  GASTROINTESTINAL: No abdominal or epigastric pain. No nausea, vomiting, or hematemesis; No diarrhea or constipation. No melena or hematochezia.  GENITOURINARY: No dysuria, frequency or hematuria  NEUROLOGICAL: No numbness or weakness  SKIN: No itching, rashes  MUSCULOSKELETAL: LLE swelling

## 2024-03-16 LAB
ALBUMIN SERPL ELPH-MCNC: 3.2 G/DL — LOW (ref 3.3–5)
ALP SERPL-CCNC: 57 U/L — SIGNIFICANT CHANGE UP (ref 40–120)
ALT FLD-CCNC: 12 U/L — SIGNIFICANT CHANGE UP (ref 4–33)
ANION GAP SERPL CALC-SCNC: 13 MMOL/L — SIGNIFICANT CHANGE UP (ref 7–14)
AST SERPL-CCNC: 16 U/L — SIGNIFICANT CHANGE UP (ref 4–32)
BILIRUB SERPL-MCNC: 1 MG/DL — SIGNIFICANT CHANGE UP (ref 0.2–1.2)
BUN SERPL-MCNC: 36 MG/DL — HIGH (ref 7–23)
CALCIUM SERPL-MCNC: 8.7 MG/DL — SIGNIFICANT CHANGE UP (ref 8.4–10.5)
CHLORIDE SERPL-SCNC: 99 MMOL/L — SIGNIFICANT CHANGE UP (ref 98–107)
CO2 SERPL-SCNC: 21 MMOL/L — LOW (ref 22–31)
CREAT SERPL-MCNC: 0.8 MG/DL — SIGNIFICANT CHANGE UP (ref 0.5–1.3)
EGFR: 86 ML/MIN/1.73M2 — SIGNIFICANT CHANGE UP
GLUCOSE BLDC GLUCOMTR-MCNC: 140 MG/DL — HIGH (ref 70–99)
GLUCOSE BLDC GLUCOMTR-MCNC: 142 MG/DL — HIGH (ref 70–99)
GLUCOSE BLDC GLUCOMTR-MCNC: 149 MG/DL — HIGH (ref 70–99)
GLUCOSE BLDC GLUCOMTR-MCNC: 171 MG/DL — HIGH (ref 70–99)
GLUCOSE SERPL-MCNC: 144 MG/DL — HIGH (ref 70–99)
HCT VFR BLD CALC: 20.3 % — CRITICAL LOW (ref 34.5–45)
HGB BLD-MCNC: 7.1 G/DL — LOW (ref 11.5–15.5)
MAGNESIUM SERPL-MCNC: 2.4 MG/DL — SIGNIFICANT CHANGE UP (ref 1.6–2.6)
MCHC RBC-ENTMCNC: 29 PG — SIGNIFICANT CHANGE UP (ref 27–34)
MCHC RBC-ENTMCNC: 35 GM/DL — SIGNIFICANT CHANGE UP (ref 32–36)
MCV RBC AUTO: 82.9 FL — SIGNIFICANT CHANGE UP (ref 80–100)
NRBC # BLD: 0 /100 WBCS — SIGNIFICANT CHANGE UP (ref 0–0)
NRBC # FLD: 0.11 K/UL — HIGH (ref 0–0)
PHOSPHATE SERPL-MCNC: 3.6 MG/DL — SIGNIFICANT CHANGE UP (ref 2.5–4.5)
PLATELET # BLD AUTO: 209 K/UL — SIGNIFICANT CHANGE UP (ref 150–400)
POTASSIUM SERPL-MCNC: 4.6 MMOL/L — SIGNIFICANT CHANGE UP (ref 3.5–5.3)
POTASSIUM SERPL-SCNC: 4.6 MMOL/L — SIGNIFICANT CHANGE UP (ref 3.5–5.3)
PROT SERPL-MCNC: 6.3 G/DL — SIGNIFICANT CHANGE UP (ref 6–8.3)
RBC # BLD: 2.45 M/UL — LOW (ref 3.8–5.2)
RBC # FLD: 14.8 % — HIGH (ref 10.3–14.5)
SODIUM SERPL-SCNC: 133 MMOL/L — LOW (ref 135–145)
WBC # BLD: 8.4 K/UL — SIGNIFICANT CHANGE UP (ref 3.8–10.5)
WBC # FLD AUTO: 8.4 K/UL — SIGNIFICANT CHANGE UP (ref 3.8–10.5)

## 2024-03-16 PROCEDURE — 99233 SBSQ HOSP IP/OBS HIGH 50: CPT

## 2024-03-16 RX ORDER — GABAPENTIN 400 MG/1
1 CAPSULE ORAL
Refills: 0 | DISCHARGE

## 2024-03-16 RX ORDER — CHOLECALCIFEROL (VITAMIN D3) 125 MCG
1 CAPSULE ORAL
Refills: 0 | DISCHARGE

## 2024-03-16 RX ORDER — METHOCARBAMOL 500 MG/1
750 TABLET, FILM COATED ORAL THREE TIMES A DAY
Refills: 0 | Status: DISCONTINUED | OUTPATIENT
Start: 2024-03-16 | End: 2024-03-25

## 2024-03-16 RX ORDER — POLYETHYLENE GLYCOL 3350 17 G/17G
17 POWDER, FOR SOLUTION ORAL DAILY
Refills: 0 | Status: DISCONTINUED | OUTPATIENT
Start: 2024-03-16 | End: 2024-03-25

## 2024-03-16 RX ORDER — TRAMADOL HYDROCHLORIDE 50 MG/1
50 TABLET ORAL ONCE
Refills: 0 | Status: DISCONTINUED | OUTPATIENT
Start: 2024-03-16 | End: 2024-03-16

## 2024-03-16 RX ORDER — POLYETHYLENE GLYCOL 3350 17 G/17G
17 POWDER, FOR SOLUTION ORAL
Refills: 0 | DISCHARGE

## 2024-03-16 RX ORDER — CHOLECALCIFEROL (VITAMIN D3) 125 MCG
5000 CAPSULE ORAL DAILY
Refills: 0 | Status: DISCONTINUED | OUTPATIENT
Start: 2024-03-16 | End: 2024-03-25

## 2024-03-16 RX ORDER — SENNA PLUS 8.6 MG/1
2 TABLET ORAL AT BEDTIME
Refills: 0 | Status: DISCONTINUED | OUTPATIENT
Start: 2024-03-16 | End: 2024-03-25

## 2024-03-16 RX ORDER — SENNA PLUS 8.6 MG/1
1 TABLET ORAL
Refills: 0 | DISCHARGE

## 2024-03-16 RX ORDER — METHOCARBAMOL 500 MG/1
1 TABLET, FILM COATED ORAL
Refills: 0 | DISCHARGE

## 2024-03-16 RX ORDER — TRAMADOL HYDROCHLORIDE 50 MG/1
50 TABLET ORAL
Refills: 0 | Status: DISCONTINUED | OUTPATIENT
Start: 2024-03-16 | End: 2024-03-23

## 2024-03-16 RX ADMIN — GABAPENTIN 300 MILLIGRAM(S): 400 CAPSULE ORAL at 05:13

## 2024-03-16 RX ADMIN — POLYETHYLENE GLYCOL 3350 17 GRAM(S): 17 POWDER, FOR SOLUTION ORAL at 13:02

## 2024-03-16 RX ADMIN — Medication 1 ENEMA: at 13:02

## 2024-03-16 RX ADMIN — Medication 5000 UNIT(S): at 15:37

## 2024-03-16 RX ADMIN — SENNA PLUS 2 TABLET(S): 8.6 TABLET ORAL at 23:10

## 2024-03-16 RX ADMIN — TRAMADOL HYDROCHLORIDE 50 MILLIGRAM(S): 50 TABLET ORAL at 05:13

## 2024-03-16 RX ADMIN — GABAPENTIN 300 MILLIGRAM(S): 400 CAPSULE ORAL at 23:01

## 2024-03-16 RX ADMIN — METHOCARBAMOL 750 MILLIGRAM(S): 500 TABLET, FILM COATED ORAL at 15:51

## 2024-03-16 RX ADMIN — GABAPENTIN 300 MILLIGRAM(S): 400 CAPSULE ORAL at 15:36

## 2024-03-16 RX ADMIN — METHOCARBAMOL 750 MILLIGRAM(S): 500 TABLET, FILM COATED ORAL at 23:01

## 2024-03-16 NOTE — PHYSICAL THERAPY INITIAL EVALUATION ADULT - MANUAL MUSCLE TESTING RESULTS, REHAB EVAL
B/l UE grossly 2/5.  Unable to engage in b/l elbow extension.  Unable to engage intrinsic hand muscles.
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
## 2024-03-16 NOTE — DIETITIAN INITIAL EVALUATION ADULT - ADD RECOMMEND
1) Continue CSTCHO diet   2) Recommend sugar free LPS 1x/day (100kcal, 15g pro)  3) Consider multivitamin once daily + vitamin C to aid in wound healing    4) Consider addition of bowel regimen  5) Encourage PO intake and honor food preferences as able. Please assist Pt with feeding   6) Monitor weights, labs, BM's, skin integrity, p.o. intake.

## 2024-03-16 NOTE — PROGRESS NOTE ADULT - SUBJECTIVE AND OBJECTIVE BOX
Kimmy Bruce, PGY1    Patient is a 58y old  Female who presents with a chief complaint of Hematoma (15 Mar 2024 11:50)      SUBJECTIVE / OVERNIGHT EVENTS: NAEO. Pt denies chest pain, SOB, N/V, fever/chills, or changes in bowel movements.    MEDICATIONS  (STANDING):  dextrose 5%. 1000 milliLiter(s) (50 mL/Hr) IV Continuous <Continuous>  dextrose 5%. 1000 milliLiter(s) (100 mL/Hr) IV Continuous <Continuous>  dextrose 50% Injectable 12.5 Gram(s) IV Push once  dextrose 50% Injectable 25 Gram(s) IV Push once  dextrose 50% Injectable 25 Gram(s) IV Push once  gabapentin 300 milliGRAM(s) Oral three times a day  glucagon  Injectable 1 milliGRAM(s) IntraMuscular once  influenza   Vaccine 0.5 milliLiter(s) IntraMuscular once  insulin lispro (ADMELOG) corrective regimen sliding scale   SubCutaneous three times a day before meals  morphine  - Injectable 2 milliGRAM(s) IV Push once    MEDICATIONS  (PRN):  acetaminophen     Tablet .. 650 milliGRAM(s) Oral every 6 hours PRN Mild Pain (1 - 3), Moderate Pain (4 - 6)  dextrose Oral Gel 15 Gram(s) Oral once PRN Blood Glucose LESS THAN 70 milliGRAM(s)/deciliter      CAPILLARY BLOOD GLUCOSE      POCT Blood Glucose.: 149 mg/dL (16 Mar 2024 08:30)  POCT Blood Glucose.: 168 mg/dL (15 Mar 2024 22:00)  POCT Blood Glucose.: 167 mg/dL (15 Mar 2024 17:28)  POCT Blood Glucose.: 168 mg/dL (15 Mar 2024 12:41)    I&O's Summary    15 Mar 2024 07:01  -  16 Mar 2024 07:00  --------------------------------------------------------  IN: 302 mL / OUT: 1100 mL / NET: -798 mL        Vital Signs Last 24 Hrs  T(C): 37.2 (16 Mar 2024 04:50), Max: 37.2 (16 Mar 2024 04:50)  T(F): 99 (16 Mar 2024 04:50), Max: 99 (16 Mar 2024 04:50)  HR: 88 (16 Mar 2024 04:50) (72 - 88)  BP: 108/62 (16 Mar 2024 04:50) (99/68 - 118/65)  BP(mean): --  RR: 17 (16 Mar 2024 04:50) (17 - 18)  SpO2: 100% (16 Mar 2024 04:50) (98% - 100%)    Parameters below as of 16 Mar 2024 04:50  Patient On (Oxygen Delivery Method): room air        PHYSICAL EXAM:  GENERAL: NAD, well-developed, well-nourished  HEAD: Atraumatic, Normocephalic  EYES: Conjunctiva and sclera clear  CHEST/LUNG: Clear to auscultation bilaterally; No wheezes or crackles  HEART: Normal S1/S2; Regular rate and rhythm; No murmurs, rubs, or gallops  ABDOMEN: Soft, Nontender, Nondistended; Bowel sounds present  EXTREMITIES: No clubbing, cyanosis, or edema  PSYCH: A&Ox3      LABS:                        7.1    8.40  )-----------( 209      ( 16 Mar 2024 04:25 )             20.3      03-16    133<L>  |  99  |  36<H>  ----------------------------<  144<H>  4.6   |  21<L>  |  0.80    Ca    8.7      16 Mar 2024 04:25  Phos  3.6     03-16  Mg     2.40     03-16    TPro  6.3  /  Alb  3.2<L>  /  TBili  1.0  /  DBili  x   /  AST  16  /  ALT  12  /  AlkPhos  57  03-16    PT/INR - ( 15 Mar 2024 01:11 )   PT: 13.6 sec;   INR: 1.21 ratio         PTT - ( 15 Mar 2024 01:11 )  PTT:33.8 sec      Urinalysis Basic - ( 16 Mar 2024 04:25 )    Color: x / Appearance: x / SG: x / pH: x  Gluc: 144 mg/dL / Ketone: x  / Bili: x / Urobili: x   Blood: x / Protein: x / Nitrite: x   Leuk Esterase: x / RBC: x / WBC x   Sq Epi: x / Non Sq Epi: x / Bacteria: x        RADIOLOGY & ADDITIONAL TESTS:

## 2024-03-16 NOTE — DIETITIAN INITIAL EVALUATION ADULT - PERTINENT LABORATORY DATA
03-16    133<L>  |  99  |  36<H>  ----------------------------<  144<H>  4.6   |  21<L>  |  0.80    Ca    8.7      16 Mar 2024 04:25  Phos  3.6     03-16  Mg     2.40     03-16    TPro  6.3  /  Alb  3.2<L>  /  TBili  1.0  /  DBili  x   /  AST  16  /  ALT  12  /  AlkPhos  57  03-16  POCT Blood Glucose.: 171 mg/dL (03-16-24 @ 12:48)  A1C with Estimated Average Glucose Result: 5.3 % (03-15-24 @ 09:50)  A1C with Estimated Average Glucose Result: 5.2 % (03-15-24 @ 01:11)

## 2024-03-16 NOTE — DIETITIAN INITIAL EVALUATION ADULT - ORAL INTAKE PTA/DIET HISTORY
Pt was living in rehab facility and recently discharged home. Reports that appetite is normally good, was only decreased x1-2 days due to not feeling well but repots is better now.
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
## 2024-03-17 ENCOUNTER — TRANSCRIPTION ENCOUNTER (OUTPATIENT)
Age: 59
End: 2024-03-17

## 2024-03-17 LAB
ALBUMIN SERPL ELPH-MCNC: 3.3 G/DL — SIGNIFICANT CHANGE UP (ref 3.3–5)
ALP SERPL-CCNC: 59 U/L — SIGNIFICANT CHANGE UP (ref 40–120)
ALT FLD-CCNC: 11 U/L — SIGNIFICANT CHANGE UP (ref 4–33)
ANION GAP SERPL CALC-SCNC: 12 MMOL/L — SIGNIFICANT CHANGE UP (ref 7–14)
AST SERPL-CCNC: 14 U/L — SIGNIFICANT CHANGE UP (ref 4–32)
BASOPHILS # BLD AUTO: 0.03 K/UL — SIGNIFICANT CHANGE UP (ref 0–0.2)
BASOPHILS NFR BLD AUTO: 0.5 % — SIGNIFICANT CHANGE UP (ref 0–2)
BILIRUB SERPL-MCNC: 0.9 MG/DL — SIGNIFICANT CHANGE UP (ref 0.2–1.2)
BLD GP AB SCN SERPL QL: NEGATIVE — SIGNIFICANT CHANGE UP
BUN SERPL-MCNC: 16 MG/DL — SIGNIFICANT CHANGE UP (ref 7–23)
CALCIUM SERPL-MCNC: 9 MG/DL — SIGNIFICANT CHANGE UP (ref 8.4–10.5)
CHLORIDE SERPL-SCNC: 99 MMOL/L — SIGNIFICANT CHANGE UP (ref 98–107)
CO2 SERPL-SCNC: 23 MMOL/L — SIGNIFICANT CHANGE UP (ref 22–31)
CREAT SERPL-MCNC: 0.58 MG/DL — SIGNIFICANT CHANGE UP (ref 0.5–1.3)
EGFR: 105 ML/MIN/1.73M2 — SIGNIFICANT CHANGE UP
EOSINOPHIL # BLD AUTO: 0.09 K/UL — SIGNIFICANT CHANGE UP (ref 0–0.5)
EOSINOPHIL NFR BLD AUTO: 1.4 % — SIGNIFICANT CHANGE UP (ref 0–6)
GLUCOSE BLDC GLUCOMTR-MCNC: 141 MG/DL — HIGH (ref 70–99)
GLUCOSE BLDC GLUCOMTR-MCNC: 148 MG/DL — HIGH (ref 70–99)
GLUCOSE BLDC GLUCOMTR-MCNC: 156 MG/DL — HIGH (ref 70–99)
GLUCOSE BLDC GLUCOMTR-MCNC: 160 MG/DL — HIGH (ref 70–99)
GLUCOSE SERPL-MCNC: 148 MG/DL — HIGH (ref 70–99)
HCT VFR BLD CALC: 21 % — CRITICAL LOW (ref 34.5–45)
HCT VFR BLD CALC: 21.8 % — LOW (ref 34.5–45)
HGB BLD-MCNC: 7.2 G/DL — LOW (ref 11.5–15.5)
HGB BLD-MCNC: 7.3 G/DL — LOW (ref 11.5–15.5)
IANC: 3.85 K/UL — SIGNIFICANT CHANGE UP (ref 1.8–7.4)
IMM GRANULOCYTES NFR BLD AUTO: 3.9 % — HIGH (ref 0–0.9)
LYMPHOCYTES # BLD AUTO: 1.52 K/UL — SIGNIFICANT CHANGE UP (ref 1–3.3)
LYMPHOCYTES # BLD AUTO: 23.8 % — SIGNIFICANT CHANGE UP (ref 13–44)
MAGNESIUM SERPL-MCNC: 2.4 MG/DL — SIGNIFICANT CHANGE UP (ref 1.6–2.6)
MCHC RBC-ENTMCNC: 28.9 PG — SIGNIFICANT CHANGE UP (ref 27–34)
MCHC RBC-ENTMCNC: 29 PG — SIGNIFICANT CHANGE UP (ref 27–34)
MCHC RBC-ENTMCNC: 33.5 GM/DL — SIGNIFICANT CHANGE UP (ref 32–36)
MCHC RBC-ENTMCNC: 34.3 GM/DL — SIGNIFICANT CHANGE UP (ref 32–36)
MCV RBC AUTO: 84.7 FL — SIGNIFICANT CHANGE UP (ref 80–100)
MCV RBC AUTO: 86.2 FL — SIGNIFICANT CHANGE UP (ref 80–100)
MONOCYTES # BLD AUTO: 0.64 K/UL — SIGNIFICANT CHANGE UP (ref 0–0.9)
MONOCYTES NFR BLD AUTO: 10 % — SIGNIFICANT CHANGE UP (ref 2–14)
NEUTROPHILS # BLD AUTO: 3.85 K/UL — SIGNIFICANT CHANGE UP (ref 1.8–7.4)
NEUTROPHILS NFR BLD AUTO: 60.4 % — SIGNIFICANT CHANGE UP (ref 43–77)
NRBC # BLD: 0 /100 WBCS — SIGNIFICANT CHANGE UP (ref 0–0)
NRBC # BLD: 0 /100 WBCS — SIGNIFICANT CHANGE UP (ref 0–0)
NRBC # FLD: 0.02 K/UL — HIGH (ref 0–0)
NRBC # FLD: 0.04 K/UL — HIGH (ref 0–0)
PHOSPHATE SERPL-MCNC: 3.5 MG/DL — SIGNIFICANT CHANGE UP (ref 2.5–4.5)
PLATELET # BLD AUTO: 208 K/UL — SIGNIFICANT CHANGE UP (ref 150–400)
PLATELET # BLD AUTO: 208 K/UL — SIGNIFICANT CHANGE UP (ref 150–400)
POTASSIUM SERPL-MCNC: 4.3 MMOL/L — SIGNIFICANT CHANGE UP (ref 3.5–5.3)
POTASSIUM SERPL-SCNC: 4.3 MMOL/L — SIGNIFICANT CHANGE UP (ref 3.5–5.3)
PROT SERPL-MCNC: 6.8 G/DL — SIGNIFICANT CHANGE UP (ref 6–8.3)
RBC # BLD: 2.48 M/UL — LOW (ref 3.8–5.2)
RBC # BLD: 2.53 M/UL — LOW (ref 3.8–5.2)
RBC # FLD: 14.9 % — HIGH (ref 10.3–14.5)
RBC # FLD: 15 % — HIGH (ref 10.3–14.5)
RH IG SCN BLD-IMP: POSITIVE — SIGNIFICANT CHANGE UP
SODIUM SERPL-SCNC: 134 MMOL/L — LOW (ref 135–145)
WBC # BLD: 6.38 K/UL — SIGNIFICANT CHANGE UP (ref 3.8–10.5)
WBC # BLD: 7.44 K/UL — SIGNIFICANT CHANGE UP (ref 3.8–10.5)
WBC # FLD AUTO: 6.38 K/UL — SIGNIFICANT CHANGE UP (ref 3.8–10.5)
WBC # FLD AUTO: 7.44 K/UL — SIGNIFICANT CHANGE UP (ref 3.8–10.5)

## 2024-03-17 PROCEDURE — 99233 SBSQ HOSP IP/OBS HIGH 50: CPT

## 2024-03-17 RX ORDER — ACETAMINOPHEN 500 MG
650 TABLET ORAL ONCE
Refills: 0 | Status: COMPLETED | OUTPATIENT
Start: 2024-03-17 | End: 2024-03-17

## 2024-03-17 RX ORDER — ACETAMINOPHEN 500 MG
1000 TABLET ORAL ONCE
Refills: 0 | Status: COMPLETED | OUTPATIENT
Start: 2024-03-17 | End: 2024-03-17

## 2024-03-17 RX ORDER — ACETAMINOPHEN 500 MG
650 TABLET ORAL EVERY 6 HOURS
Refills: 0 | Status: DISCONTINUED | OUTPATIENT
Start: 2024-03-17 | End: 2024-03-25

## 2024-03-17 RX ADMIN — TRAMADOL HYDROCHLORIDE 50 MILLIGRAM(S): 50 TABLET ORAL at 21:16

## 2024-03-17 RX ADMIN — TRAMADOL HYDROCHLORIDE 50 MILLIGRAM(S): 50 TABLET ORAL at 06:15

## 2024-03-17 RX ADMIN — POLYETHYLENE GLYCOL 3350 17 GRAM(S): 17 POWDER, FOR SOLUTION ORAL at 12:19

## 2024-03-17 RX ADMIN — Medication 1 ENEMA: at 19:01

## 2024-03-17 RX ADMIN — GABAPENTIN 300 MILLIGRAM(S): 400 CAPSULE ORAL at 14:31

## 2024-03-17 RX ADMIN — Medication 5000 UNIT(S): at 12:20

## 2024-03-17 RX ADMIN — Medication 400 MILLIGRAM(S): at 12:19

## 2024-03-17 RX ADMIN — GABAPENTIN 300 MILLIGRAM(S): 400 CAPSULE ORAL at 21:15

## 2024-03-17 RX ADMIN — METHOCARBAMOL 750 MILLIGRAM(S): 500 TABLET, FILM COATED ORAL at 21:15

## 2024-03-17 RX ADMIN — SENNA PLUS 2 TABLET(S): 8.6 TABLET ORAL at 21:15

## 2024-03-17 RX ADMIN — GABAPENTIN 300 MILLIGRAM(S): 400 CAPSULE ORAL at 06:19

## 2024-03-17 RX ADMIN — METHOCARBAMOL 750 MILLIGRAM(S): 500 TABLET, FILM COATED ORAL at 14:32

## 2024-03-17 RX ADMIN — METHOCARBAMOL 750 MILLIGRAM(S): 500 TABLET, FILM COATED ORAL at 06:15

## 2024-03-17 NOTE — DISCHARGE NOTE PROVIDER - DISCHARGING ATTENDING PHYSICIAN:
Subjective   Emy Mcdonald is a 89 y.o. female who presents for Follow-up, Med Refill, Dizziness (Lightheaded constant when walking ), and Knee Pain (Right knee swelling ).    Doing well at [facility].    C/o right knee swelling and pain.   Also feels lightheaded when she exercises.  BP readings from the [place] have been high.  No low readings.   Eats regularly but watches very closely.  Doesn't want to gain weight.          Review of Systems   Constitutional:  Negative for fever.   Respiratory:  Negative for shortness of breath.    Cardiovascular:  Negative for chest pain.   Gastrointestinal:  Negative for nausea and vomiting.   Neurological:  Negative for dizziness and light-headedness.   All other systems reviewed and are negative.      Objective   /86 (BP Location: Left arm, Patient Position: Sitting, BP Cuff Size: Small adult)   Pulse 70   Temp 35.8 °C (96.5 °F)   Resp 14   Ht 1.524 m (5')   Wt (!) 43 kg (94 lb 12.8 oz) Comment: 94.8lbs  SpO2 99%   BMI 18.51 kg/m²     Physical Exam  HENT:      Head: Normocephalic and atraumatic.      Mouth/Throat:      Mouth: Mucous membranes are moist.      Pharynx: Oropharynx is clear.   Eyes:      Extraocular Movements: Extraocular movements intact.      Pupils: Pupils are equal, round, and reactive to light.   Cardiovascular:      Rate and Rhythm: Normal rate and regular rhythm.      Heart sounds: Normal heart sounds.   Pulmonary:      Effort: Pulmonary effort is normal.      Breath sounds: Normal breath sounds.   Musculoskeletal:         General: Normal range of motion.      Cervical back: Normal range of motion.      Comments: Right knee with large effusion.  No tenderness or erythema.    Lymphadenopathy:      Cervical: No cervical adenopathy.   Skin:     General: Skin is warm and dry.   Neurological:      General: No focal deficit present.      Mental Status: She is alert.   Psychiatric:         Mood and Affect: Mood normal.         Assessment/Plan    Problem List Items Addressed This Visit       Primary hypertension    Relevant Medications    lisinopril 5 mg tablet    Knee effusion, right - Primary    Relevant Orders    Referral to Orthopaedic Surgery    Protein-calorie malnutrition, unspecified severity (CMS/HCC)     Other Visit Diagnoses       Well adult health check        Relevant Orders    CBC    Comprehensive Metabolic Panel    Follow Up In [facility] - PCP - Health Maintenance    Anemia, unspecified type        Relevant Orders    CBC              There are no Patient Instructions on file for this visit.   Carlota Macias

## 2024-03-17 NOTE — DISCHARGE NOTE PROVIDER - ATTENDING DISCHARGE PHYSICAL EXAMINATION:
Vital Signs Last 24 Hrs  T(C): 36.6 (24 Mar 2024 22:26), Max: 36.6 (24 Mar 2024 22:26)  T(F): 97.9 (24 Mar 2024 22:26), Max: 97.9 (24 Mar 2024 22:26)  HR: 72 (24 Mar 2024 22:26) (72 - 72)  BP: 135/82 (24 Mar 2024 22:26) (135/82 - 135/82)  BP(mean): --  RR: 18 (24 Mar 2024 22:26) (18 - 18)  SpO2: 100% (24 Mar 2024 22:26) (100% - 100%)    Parameters below as of 24 Mar 2024 22:26  Patient On (Oxygen Delivery Method): room air    PHYSICAL EXAM:  GENERAL: Laying comfortably, NAD  HEENT: NCAT, PERRLA, EOMI, no scleral icterus, no LAD  LUNG: CTABL; No wheezes, crackles, or rhonchi  HEART: RRR; normal S1/S2; No murmurs, rubs, or gallops  ABDOMEN: +BS, soft, nontender, nondistended  EXTREMITIES:  +enlarged left thigh but unchanged in appearance compared to prior, no ecchymosis  NEUROLOGY: AOx3, non-focal, strength 5/5 in all extremities, sensation intact  PSYCH: calm and cooperative  SKIN: No rashes or lesions

## 2024-03-17 NOTE — DISCHARGE NOTE PROVIDER - NSDCFUADDAPPT_GEN_ALL_CORE_FT
Please follow up with PCP about repeating a blood count and the need for restarting anticoagulation Please follow up with PCP about repeating a blood count, need for restarting anticoagulation, and diabetes
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
## 2024-03-17 NOTE — PROGRESS NOTE ADULT - SUBJECTIVE AND OBJECTIVE BOX
Patient is a 58y old  Female who presents with a chief complaint of Hematoma (15 Mar 2024 11:50)      SUBJECTIVE / OVERNIGHT EVENTS: NAEO. Pt denies chest pain, SOB, N/V, fever/chills, or changes in bowel movements.    MEDICATIONS  (STANDING):  dextrose 5%. 1000 milliLiter(s) (50 mL/Hr) IV Continuous <Continuous>  dextrose 5%. 1000 milliLiter(s) (100 mL/Hr) IV Continuous <Continuous>  dextrose 50% Injectable 12.5 Gram(s) IV Push once  dextrose 50% Injectable 25 Gram(s) IV Push once  dextrose 50% Injectable 25 Gram(s) IV Push once  gabapentin 300 milliGRAM(s) Oral three times a day  glucagon  Injectable 1 milliGRAM(s) IntraMuscular once  influenza   Vaccine 0.5 milliLiter(s) IntraMuscular once  insulin lispro (ADMELOG) corrective regimen sliding scale   SubCutaneous three times a day before meals  morphine  - Injectable 2 milliGRAM(s) IV Push once    MEDICATIONS  (PRN):  acetaminophen     Tablet .. 650 milliGRAM(s) Oral every 6 hours PRN Mild Pain (1 - 3), Moderate Pain (4 - 6)  dextrose Oral Gel 15 Gram(s) Oral once PRN Blood Glucose LESS THAN 70 milliGRAM(s)/deciliter      CAPILLARY BLOOD GLUCOSE      POCT Blood Glucose.: 149 mg/dL (16 Mar 2024 08:30)  POCT Blood Glucose.: 168 mg/dL (15 Mar 2024 22:00)  POCT Blood Glucose.: 167 mg/dL (15 Mar 2024 17:28)  POCT Blood Glucose.: 168 mg/dL (15 Mar 2024 12:41)    I&O's Summary    15 Mar 2024 07:01  -  16 Mar 2024 07:00  --------------------------------------------------------  IN: 302 mL / OUT: 1100 mL / NET: -798 mL        Vital Signs Last 24 Hrs  T(C): 37.2 (16 Mar 2024 04:50), Max: 37.2 (16 Mar 2024 04:50)  T(F): 99 (16 Mar 2024 04:50), Max: 99 (16 Mar 2024 04:50)  HR: 88 (16 Mar 2024 04:50) (72 - 88)  BP: 108/62 (16 Mar 2024 04:50) (99/68 - 118/65)  BP(mean): --  RR: 17 (16 Mar 2024 04:50) (17 - 18)  SpO2: 100% (16 Mar 2024 04:50) (98% - 100%)    Parameters below as of 16 Mar 2024 04:50  Patient On (Oxygen Delivery Method): room air        PHYSICAL EXAM:  GENERAL: NAD, well-developed, well-nourished  HEAD: Atraumatic, Normocephalic  EYES: Conjunctiva and sclera clear  CHEST/LUNG: Clear to auscultation bilaterally; No wheezes or crackles  HEART: Normal S1/S2; Regular rate and rhythm; No murmurs, rubs, or gallops  ABDOMEN: Soft, Nontender, Nondistended; Bowel sounds present  EXTREMITIES: No clubbing, cyanosis, or edema  PSYCH: A&Ox3      LABS:                        7.1    8.40  )-----------( 209      ( 16 Mar 2024 04:25 )             20.3      03-16    133<L>  |  99  |  36<H>  ----------------------------<  144<H>  4.6   |  21<L>  |  0.80    Ca    8.7      16 Mar 2024 04:25  Phos  3.6     03-16  Mg     2.40     03-16    TPro  6.3  /  Alb  3.2<L>  /  TBili  1.0  /  DBili  x   /  AST  16  /  ALT  12  /  AlkPhos  57  03-16    PT/INR - ( 15 Mar 2024 01:11 )   PT: 13.6 sec;   INR: 1.21 ratio         PTT - ( 15 Mar 2024 01:11 )  PTT:33.8 sec      Urinalysis Basic - ( 16 Mar 2024 04:25 )    Color: x / Appearance: x / SG: x / pH: x  Gluc: 144 mg/dL / Ketone: x  / Bili: x / Urobili: x   Blood: x / Protein: x / Nitrite: x   Leuk Esterase: x / RBC: x / WBC x   Sq Epi: x / Non Sq Epi: x / Bacteria: x        RADIOLOGY & ADDITIONAL TESTS:       Patient is a 58y old  Female who presents with a chief complaint of Hematoma (15 Mar 2024 11:50)      SUBJECTIVE / OVERNIGHT EVENTS: NAEON. Patient complained of persistent L leg pain, 8/10. Pt denies chest pain, SOB, N/V, fever/chills, or changes in bowel movements.    MEDICATIONS  (STANDING):  dextrose 5%. 1000 milliLiter(s) (50 mL/Hr) IV Continuous <Continuous>  dextrose 5%. 1000 milliLiter(s) (100 mL/Hr) IV Continuous <Continuous>  dextrose 50% Injectable 12.5 Gram(s) IV Push once  dextrose 50% Injectable 25 Gram(s) IV Push once  dextrose 50% Injectable 25 Gram(s) IV Push once  gabapentin 300 milliGRAM(s) Oral three times a day  glucagon  Injectable 1 milliGRAM(s) IntraMuscular once  influenza   Vaccine 0.5 milliLiter(s) IntraMuscular once  insulin lispro (ADMELOG) corrective regimen sliding scale   SubCutaneous three times a day before meals  morphine  - Injectable 2 milliGRAM(s) IV Push once    MEDICATIONS  (PRN):  acetaminophen     Tablet .. 650 milliGRAM(s) Oral every 6 hours PRN Mild Pain (1 - 3), Moderate Pain (4 - 6)  dextrose Oral Gel 15 Gram(s) Oral once PRN Blood Glucose LESS THAN 70 milliGRAM(s)/deciliter      CAPILLARY BLOOD GLUCOSE      POCT Blood Glucose.: 149 mg/dL (16 Mar 2024 08:30)  POCT Blood Glucose.: 168 mg/dL (15 Mar 2024 22:00)  POCT Blood Glucose.: 167 mg/dL (15 Mar 2024 17:28)  POCT Blood Glucose.: 168 mg/dL (15 Mar 2024 12:41)    I&O's Summary    15 Mar 2024 07:01  -  16 Mar 2024 07:00  --------------------------------------------------------  IN: 302 mL / OUT: 1100 mL / NET: -798 mL        Vital Signs Last 24 Hrs  T(C): 37 (17 Mar 2024 06:00), Max: 37 (16 Mar 2024 21:57)  T(F): 98.6 (17 Mar 2024 06:00), Max: 98.6 (16 Mar 2024 21:57)  HR: 82 (17 Mar 2024 06:00) (77 - 82)  BP: 109/60 (17 Mar 2024 06:00) (95/61 - 109/60)  BP(mean): --  RR: 18 (17 Mar 2024 06:00) (17 - 18)  SpO2: 99% (17 Mar 2024 06:00) (98% - 99%)    Parameters below as of 17 Mar 2024 06:00  Patient On (Oxygen Delivery Method): room air      PHYSICAL EXAM:  GENERAL: NAD, well-developed, well-nourished  HEAD: Atraumatic, Normocephalic  EYES: Conjunctiva and sclera clear  CHEST/LUNG: Clear to auscultation bilaterally; No wheezes or crackles  HEART: Normal S1/S2; Regular rate and rhythm; No murmurs, rubs, or gallops  ABDOMEN: Soft, Nontender, Nondistended; Bowel sounds present  EXTREMITIES: No clubbing, cyanosis, or edema  PSYCH: A&Ox3      LABS:             CBC Full  -  ( 17 Mar 2024 07:12 )  WBC Count : 6.38 K/uL  RBC Count : 2.53 M/uL  Hemoglobin : 7.3 g/dL  Hematocrit : 21.8 %  Platelet Count - Automated : 208 K/uL  Mean Cell Volume : 86.2 fL  Mean Cell Hemoglobin : 28.9 pg  Mean Cell Hemoglobin Concentration : 33.5 gm/dL  Auto Neutrophil # : 3.85 K/uL  Auto Lymphocyte # : 1.52 K/uL  Auto Monocyte # : 0.64 K/uL  Auto Eosinophil # : 0.09 K/uL  Auto Basophil # : 0.03 K/uL  Auto Neutrophil % : 60.4 %  Auto Lymphocyte % : 23.8 %  Auto Monocyte % : 10.0 %  Auto Eosinophil % : 1.4 %  Auto Basophil % : 0.5 %    03-17    134<L>  |  99  |  16  ----------------------------<  148<H>  4.3   |  23  |  0.58    Ca    9.0      17 Mar 2024 07:12  Phos  3.5     03-17  Mg     2.40     03-17    TPro  6.8  /  Alb  3.3  /  TBili  0.9  /  DBili  x   /  AST  14  /  ALT  11  /  AlkPhos  59  03-17       PTT - ( 15 Mar 2024 01:11 )  PTT:33.8 sec      Urinalysis Basic - ( 16 Mar 2024 04:25 )    Color: x / Appearance: x / SG: x / pH: x  Gluc: 144 mg/dL / Ketone: x  / Bili: x / Urobili: x   Blood: x / Protein: x / Nitrite: x   Leuk Esterase: x / RBC: x / WBC x   Sq Epi: x / Non Sq Epi: x / Bacteria: x        RADIOLOGY & ADDITIONAL TESTS:

## 2024-03-17 NOTE — DISCHARGE NOTE PROVIDER - NSDCMRMEDTOKEN_GEN_ALL_CORE_FT
cholecalciferol 125 mcg (5000 intl units) oral tablet: 1 tab(s) orally once a day  gabapentin 300 mg oral tablet: 1 tab(s) orally 3 times a day  methocarbamol 750 mg oral tablet: 1 tab(s) orally 3 times a day  MiraLax oral powder for reconstitution: 17 gram(s) orally once a day  senna (sennosides) 17 mg oral tablet: 1 tab(s) orally once a day (at bedtime)  traMADol 50 mg oral tablet: 1 tab(s) orally 2 times a day   alcohol swabs : Apply topically to affected area 4 times a day  cholecalciferol 125 mcg (5000 intl units) oral tablet: 1 tab(s) orally once a day  gabapentin 300 mg oral tablet: 1 tab(s) orally 3 times a day  glucometer (per patient&#x27;s insurance): Test blood sugars four times a day. Dispense #1 glucometer.  lancets: 1 application subcutaneously 4 times a day  methocarbamol 750 mg oral tablet: 1 tab(s) orally 3 times a day  MiraLax oral powder for reconstitution: 17 gram(s) orally once a day  senna (sennosides) 17 mg oral tablet: 1 tab(s) orally once a day (at bedtime)  test strips (per patient&#x27;s insurance): 1 application subcutaneously 4 times a day. ** Compatible with patient&#x27;s glucometer **  traMADol 50 mg oral tablet: 1 tab(s) orally 2 times a day

## 2024-03-17 NOTE — DISCHARGE NOTE PROVIDER - NSDCCPTREATMENT_GEN_ALL_CORE_FT
PRINCIPAL PROCEDURE  Procedure: CT (computed tomography)  Findings and Treatment: IMPRESSION:  Large left medial/posterior thigh compartment intramuscular hematoma. No   evidence of active bleeding.< from: CT Angio Lower Extremity w/ IV Cont, Left (03.15.24 @ 06:16) >       PRINCIPAL PROCEDURE  Procedure: CT (computed tomography)  Findings and Treatment: IMPRESSION:  Large left medial/posterior thigh compartment intramuscular hematoma. No   evidence of active bleeding     PRINCIPAL PROCEDURE  Procedure: CT (computed tomography)  Findings and Treatment: IMPRESSION:  Large left medial/posterior thigh compartment intramuscular hematoma. No   evidence of active bleeding      SECONDARY PROCEDURE  Procedure: Ultrasound of upper abdomen  Findings and Treatment: FINDINGS:  Liver: Hyperechoic right lobe are lesion measuring 1.7 x 1.4 x 1.2 cm.   Left lobe are 1 cm cyst.  Bile ducts: Normal caliber. Common bile duct measures 6 mm.  Gallbladder: Within normal limits. Watts's sign negative.  Pancreas: Visualized portions are within normal limits.  Right kidney: 9.7 cm. No hydronephrosis. No masses or stones.  Ascites: None.  IVC: Visualized portions are within normal limits.  IMPRESSION:  Hyperechoic right lobe liver lesion measuring up to 1.7 cm, most   consistent with hemangioma.  Small left hepatic lobe cyst.

## 2024-03-17 NOTE — DISCHARGE NOTE PROVIDER - NSDCCPCAREPLAN_GEN_ALL_CORE_FT
PRINCIPAL DISCHARGE DIAGNOSIS  Diagnosis: Anemia due to acute blood loss  Assessment and Plan of Treatment: On admission you were found to be severely low hemoglobin and exhibiting signs of significant anemia. Your anemia is most likely a result of slow bleeding into the hematoma on your leg over time. We gave you multiple transfusions to increase your red blood cell counts and you progressively improved.   Return to hospital if you have any light-headedness, dizziness, chest pain, shortness of breath, or excessive fatigue      SECONDARY DISCHARGE DIAGNOSES  Diagnosis: Intramuscular hematoma  Assessment and Plan of Treatment: You were also found to have a quite large hematoma on your Left thigh which we took imaging of showing that it  had not been actively bleeding. This hematoma likely resulted from some minor trauma to the extremity while you were on a blood thinner. You were seen by the vascular team here who ultimately deemed you were safe to be placed back on your blood thinner.     PRINCIPAL DISCHARGE DIAGNOSIS  Diagnosis: Anemia due to acute blood loss  Assessment and Plan of Treatment: On admission you were found to be severely low hemoglobin and exhibiting signs of significant anemia. Your anemia is most likely a result of slow bleeding into the hematoma on your leg over time. We gave you multiple transfusions to increase your red blood cell counts and you progressively improved.   Return to hospital if you have any light-headedness, dizziness, chest pain, shortness of breath, or excessive fatigue      SECONDARY DISCHARGE DIAGNOSES  Diagnosis: Intramuscular hematoma  Assessment and Plan of Treatment: You were also found to have a quite large hematoma on your Left thigh which we took imaging of showing that it  had not been actively bleeding. This hematoma likely resulted from some minor trauma to the extremity while you were on a blood thinner. You were seen by the vascular team here who ultimately deemed you were safe to be placed back on your blood thinner, however we continued to hold it while inpateint with plans to potentially restart it if within you goals of care. of note, we saw a liver lesion while on imaging     PRINCIPAL DISCHARGE DIAGNOSIS  Diagnosis: Anemia due to acute blood loss  Assessment and Plan of Treatment: On admission you were found to be severely low hemoglobin and exhibiting signs of significant anemia. Your anemia is most likely a result of slow bleeding into the hematoma on your leg over time. We gave you multiple transfusions to increase your red blood cell counts and you progressively improved.   Return to hospital if you have any light-headedness, dizziness, chest pain, shortness of breath, or excessive fatigue      SECONDARY DISCHARGE DIAGNOSES  Diagnosis: Intramuscular hematoma  Assessment and Plan of Treatment: You were also found to have a quite large hematoma on your Left thigh which we took imaging of showing that it  had not been actively bleeding. This hematoma likely resulted from some minor trauma to the extremity while you were on a blood thinner. You were seen by the vascular team here who ultimately deemed you were safe to be placed back on your blood thinner, however we continued to hold it while inpateint with plans to potentially restart it if within you goals of care. of note, we saw a liver lesion while on imaging that came back as a benign growth consolidation of blood vessels.

## 2024-03-17 NOTE — PROGRESS NOTE ADULT - PROBLEM SELECTOR PLAN 1
LLE Hematoma  - CTA LLE without active bleeding  -  light compression  - hold therapeutic coagulation i/s/o of anemia and IVC filter in place  - no role for surgical intervention on spontaneous hematoma unless concern for compartment syndrome- unable to assess motor and sensation but strong palpable DP pulse, compartments soft - low concern for compartment syndrome; patient remains HD stable LLE Hematoma  - CTA LLE without active bleeding  -  light compression  - hold therapeutic coagulation i/s/o of anemia and IVC filter in place  - no role for surgical intervention on spontaneous hematoma unless concern for compartment syndrome- unable to assess motor and sensation but strong palpable DP pulse, compartments soft - low concern for compartment syndrome; patient remains HD stable  -standing tylenol for pain  -tramadol 50 bid

## 2024-03-18 LAB
ALBUMIN SERPL ELPH-MCNC: 3.1 G/DL — LOW (ref 3.3–5)
ALP SERPL-CCNC: 60 U/L — SIGNIFICANT CHANGE UP (ref 40–120)
ALT FLD-CCNC: 14 U/L — SIGNIFICANT CHANGE UP (ref 4–33)
ANION GAP SERPL CALC-SCNC: 11 MMOL/L — SIGNIFICANT CHANGE UP (ref 7–14)
ANISOCYTOSIS BLD QL: SLIGHT — SIGNIFICANT CHANGE UP
AST SERPL-CCNC: 12 U/L — SIGNIFICANT CHANGE UP (ref 4–32)
BASOPHILS # BLD AUTO: 0.07 K/UL — SIGNIFICANT CHANGE UP (ref 0–0.2)
BASOPHILS NFR BLD AUTO: 0.9 % — SIGNIFICANT CHANGE UP (ref 0–2)
BILIRUB SERPL-MCNC: 1 MG/DL — SIGNIFICANT CHANGE UP (ref 0.2–1.2)
BUN SERPL-MCNC: 20 MG/DL — SIGNIFICANT CHANGE UP (ref 7–23)
CALCIUM SERPL-MCNC: 9.2 MG/DL — SIGNIFICANT CHANGE UP (ref 8.4–10.5)
CHLORIDE SERPL-SCNC: 101 MMOL/L — SIGNIFICANT CHANGE UP (ref 98–107)
CO2 SERPL-SCNC: 24 MMOL/L — SIGNIFICANT CHANGE UP (ref 22–31)
CREAT SERPL-MCNC: 0.68 MG/DL — SIGNIFICANT CHANGE UP (ref 0.5–1.3)
EGFR: 102 ML/MIN/1.73M2 — SIGNIFICANT CHANGE UP
EOSINOPHIL # BLD AUTO: 0.22 K/UL — SIGNIFICANT CHANGE UP (ref 0–0.5)
EOSINOPHIL NFR BLD AUTO: 2.7 % — SIGNIFICANT CHANGE UP (ref 0–6)
GLUCOSE BLDC GLUCOMTR-MCNC: 125 MG/DL — HIGH (ref 70–99)
GLUCOSE BLDC GLUCOMTR-MCNC: 137 MG/DL — HIGH (ref 70–99)
GLUCOSE BLDC GLUCOMTR-MCNC: 143 MG/DL — HIGH (ref 70–99)
GLUCOSE BLDC GLUCOMTR-MCNC: 157 MG/DL — HIGH (ref 70–99)
GLUCOSE SERPL-MCNC: 114 MG/DL — HIGH (ref 70–99)
HCT VFR BLD CALC: 20.6 % — CRITICAL LOW (ref 34.5–45)
HCT VFR BLD CALC: 24.3 % — LOW (ref 34.5–45)
HCT VFR BLD CALC: 26 % — LOW (ref 34.5–45)
HGB BLD-MCNC: 6.8 G/DL — CRITICAL LOW (ref 11.5–15.5)
HGB BLD-MCNC: 8.2 G/DL — LOW (ref 11.5–15.5)
HGB BLD-MCNC: 8.6 G/DL — LOW (ref 11.5–15.5)
IANC: 4.34 K/UL — SIGNIFICANT CHANGE UP (ref 1.8–7.4)
LYMPHOCYTES # BLD AUTO: 1.23 K/UL — SIGNIFICANT CHANGE UP (ref 1–3.3)
LYMPHOCYTES # BLD AUTO: 15 % — SIGNIFICANT CHANGE UP (ref 13–44)
MACROCYTES BLD QL: SLIGHT — SIGNIFICANT CHANGE UP
MAGNESIUM SERPL-MCNC: 2.4 MG/DL — SIGNIFICANT CHANGE UP (ref 1.6–2.6)
MANUAL SMEAR VERIFICATION: SIGNIFICANT CHANGE UP
MCHC RBC-ENTMCNC: 28.9 PG — SIGNIFICANT CHANGE UP (ref 27–34)
MCHC RBC-ENTMCNC: 29 PG — SIGNIFICANT CHANGE UP (ref 27–34)
MCHC RBC-ENTMCNC: 29.8 PG — SIGNIFICANT CHANGE UP (ref 27–34)
MCHC RBC-ENTMCNC: 33 GM/DL — SIGNIFICANT CHANGE UP (ref 32–36)
MCHC RBC-ENTMCNC: 33.1 GM/DL — SIGNIFICANT CHANGE UP (ref 32–36)
MCHC RBC-ENTMCNC: 33.7 GM/DL — SIGNIFICANT CHANGE UP (ref 32–36)
MCV RBC AUTO: 87.5 FL — SIGNIFICANT CHANGE UP (ref 80–100)
MCV RBC AUTO: 87.7 FL — SIGNIFICANT CHANGE UP (ref 80–100)
MCV RBC AUTO: 88.4 FL — SIGNIFICANT CHANGE UP (ref 80–100)
MICROCYTES BLD QL: SLIGHT — SIGNIFICANT CHANGE UP
MONOCYTES # BLD AUTO: 0.29 K/UL — SIGNIFICANT CHANGE UP (ref 0–0.9)
MONOCYTES NFR BLD AUTO: 3.5 % — SIGNIFICANT CHANGE UP (ref 2–14)
MYELOCYTES NFR BLD: 1.8 % — HIGH (ref 0–0)
NEUTROPHILS # BLD AUTO: 5.66 K/UL — SIGNIFICANT CHANGE UP (ref 1.8–7.4)
NEUTROPHILS NFR BLD AUTO: 69 % — SIGNIFICANT CHANGE UP (ref 43–77)
NRBC # BLD: 0 /100 WBCS — SIGNIFICANT CHANGE UP (ref 0–0)
NRBC # BLD: 0 /100 WBCS — SIGNIFICANT CHANGE UP (ref 0–0)
NRBC # BLD: 1 /100 WBCS — HIGH (ref 0–0)
NRBC # FLD: 0 K/UL — SIGNIFICANT CHANGE UP (ref 0–0)
NRBC # FLD: 0 K/UL — SIGNIFICANT CHANGE UP (ref 0–0)
OVALOCYTES BLD QL SMEAR: SLIGHT — SIGNIFICANT CHANGE UP
PHOSPHATE SERPL-MCNC: 4 MG/DL — SIGNIFICANT CHANGE UP (ref 2.5–4.5)
PLAT MORPH BLD: NORMAL — SIGNIFICANT CHANGE UP
PLATELET # BLD AUTO: 220 K/UL — SIGNIFICANT CHANGE UP (ref 150–400)
PLATELET # BLD AUTO: 225 K/UL — SIGNIFICANT CHANGE UP (ref 150–400)
PLATELET # BLD AUTO: 253 K/UL — SIGNIFICANT CHANGE UP (ref 150–400)
PLATELET COUNT - ESTIMATE: NORMAL — SIGNIFICANT CHANGE UP
POIKILOCYTOSIS BLD QL AUTO: SLIGHT — SIGNIFICANT CHANGE UP
POLYCHROMASIA BLD QL SMEAR: SLIGHT — SIGNIFICANT CHANGE UP
POTASSIUM SERPL-MCNC: 4.8 MMOL/L — SIGNIFICANT CHANGE UP (ref 3.5–5.3)
POTASSIUM SERPL-SCNC: 4.8 MMOL/L — SIGNIFICANT CHANGE UP (ref 3.5–5.3)
PROT SERPL-MCNC: 6.8 G/DL — SIGNIFICANT CHANGE UP (ref 6–8.3)
RBC # BLD: 2.35 M/UL — LOW (ref 3.8–5.2)
RBC # BLD: 2.75 M/UL — LOW (ref 3.8–5.2)
RBC # BLD: 2.97 M/UL — LOW (ref 3.8–5.2)
RBC # FLD: 14.8 % — HIGH (ref 10.3–14.5)
RBC # FLD: 15.3 % — HIGH (ref 10.3–14.5)
RBC # FLD: 15.6 % — HIGH (ref 10.3–14.5)
RBC BLD AUTO: NORMAL — SIGNIFICANT CHANGE UP
SODIUM SERPL-SCNC: 136 MMOL/L — SIGNIFICANT CHANGE UP (ref 135–145)
VARIANT LYMPHS # BLD: 7.1 % — HIGH (ref 0–6)
WBC # BLD: 6.77 K/UL — SIGNIFICANT CHANGE UP (ref 3.8–10.5)
WBC # BLD: 8.2 K/UL — SIGNIFICANT CHANGE UP (ref 3.8–10.5)
WBC # BLD: 8.85 K/UL — SIGNIFICANT CHANGE UP (ref 3.8–10.5)
WBC # FLD AUTO: 6.77 K/UL — SIGNIFICANT CHANGE UP (ref 3.8–10.5)
WBC # FLD AUTO: 8.2 K/UL — SIGNIFICANT CHANGE UP (ref 3.8–10.5)
WBC # FLD AUTO: 8.85 K/UL — SIGNIFICANT CHANGE UP (ref 3.8–10.5)

## 2024-03-18 PROCEDURE — 99232 SBSQ HOSP IP/OBS MODERATE 35: CPT | Mod: GC

## 2024-03-18 RX ADMIN — METHOCARBAMOL 750 MILLIGRAM(S): 500 TABLET, FILM COATED ORAL at 05:56

## 2024-03-18 RX ADMIN — Medication 650 MILLIGRAM(S): at 05:56

## 2024-03-18 RX ADMIN — METHOCARBAMOL 750 MILLIGRAM(S): 500 TABLET, FILM COATED ORAL at 12:52

## 2024-03-18 RX ADMIN — GABAPENTIN 300 MILLIGRAM(S): 400 CAPSULE ORAL at 21:26

## 2024-03-18 RX ADMIN — Medication 650 MILLIGRAM(S): at 18:12

## 2024-03-18 RX ADMIN — METHOCARBAMOL 750 MILLIGRAM(S): 500 TABLET, FILM COATED ORAL at 21:27

## 2024-03-18 RX ADMIN — Medication 650 MILLIGRAM(S): at 12:49

## 2024-03-18 RX ADMIN — TRAMADOL HYDROCHLORIDE 50 MILLIGRAM(S): 50 TABLET ORAL at 18:12

## 2024-03-18 RX ADMIN — TRAMADOL HYDROCHLORIDE 50 MILLIGRAM(S): 50 TABLET ORAL at 05:56

## 2024-03-18 RX ADMIN — Medication 1 ENEMA: at 10:17

## 2024-03-18 RX ADMIN — Medication 5000 UNIT(S): at 12:51

## 2024-03-18 RX ADMIN — POLYETHYLENE GLYCOL 3350 17 GRAM(S): 17 POWDER, FOR SOLUTION ORAL at 12:51

## 2024-03-18 RX ADMIN — GABAPENTIN 300 MILLIGRAM(S): 400 CAPSULE ORAL at 12:49

## 2024-03-18 RX ADMIN — SENNA PLUS 2 TABLET(S): 8.6 TABLET ORAL at 21:26

## 2024-03-18 RX ADMIN — GABAPENTIN 300 MILLIGRAM(S): 400 CAPSULE ORAL at 05:56

## 2024-03-18 NOTE — PROVIDER CONTACT NOTE (OTHER) - ACTION/TREATMENT ORDERED:
MD made aware, tylenol ordered
md made aware. no new interventions at this time. pt was educated on importance of insulin and signs of hyperglycemia
MD made aware. no new interventions at this time
MD made aware. recheck BP in 30 minutes

## 2024-03-18 NOTE — PROVIDER CONTACT NOTE (CRITICAL VALUE NOTIFICATION) - BACKGROUND
59 yo F admitted for anemia.
Patient admitted for anemia due to acute blood loss
67 yo F admitted for anemia

## 2024-03-18 NOTE — PROVIDER CONTACT NOTE (OTHER) - ASSESSMENT
Patient shows no signs of distress, resting comfortably at this time
Patient shows no signs of distress, resting comfortably at this time
pt a&o x4. pt denies tiredness, excessive thirst, hunger and blurry vision. no signs of acute distress noted
Patient shows no signs of distress, resting comfortably at this time. Blood transfusion is currently running at 100ml/hr.

## 2024-03-18 NOTE — PROGRESS NOTE ADULT - SUBJECTIVE AND OBJECTIVE BOX
***************************************************************  Edin Roberts, PGY2  Internal Medicine   TEAMS Preferred  ***************************************************************    SOPHIE URIOSTEGUI  58y  MRN: [id number]  03-15[birth year]24 (3d)    Patient is a 58y old  Female who presents with a chief complaint of Hematoma (17 Mar 2024 14:45)      SUBJECTIVE / OVERNIGHT EVENTS:   No acute overnight events. Pt seen and examined at bedside. Denies fevers, chills, CP, SOB, Abdominal pain, N/V, Constipation, Diarrhea. Last BM     12 Point ROS negative with the exception of the above    MEDICATIONS  (STANDING):  acetaminophen     Tablet .. 650 milliGRAM(s) Oral every 6 hours  cholecalciferol 5000 Unit(s) Oral daily  dextrose 5%. 1000 milliLiter(s) (50 mL/Hr) IV Continuous <Continuous>  dextrose 5%. 1000 milliLiter(s) (100 mL/Hr) IV Continuous <Continuous>  dextrose 50% Injectable 12.5 Gram(s) IV Push once  dextrose 50% Injectable 25 Gram(s) IV Push once  dextrose 50% Injectable 25 Gram(s) IV Push once  gabapentin 300 milliGRAM(s) Oral three times a day  glucagon  Injectable 1 milliGRAM(s) IntraMuscular once  influenza   Vaccine 0.5 milliLiter(s) IntraMuscular once  insulin lispro (ADMELOG) corrective regimen sliding scale   SubCutaneous three times a day before meals  methocarbamol 750 milliGRAM(s) Oral three times a day  polyethylene glycol 3350 17 Gram(s) Oral daily  senna 2 Tablet(s) Oral at bedtime  traMADol 50 milliGRAM(s) Oral two times a day    MEDICATIONS  (PRN):  dextrose Oral Gel 15 Gram(s) Oral once PRN Blood Glucose LESS THAN 70 milliGRAM(s)/deciliter  saline laxative (FLEET) Rectal Enema 1 Enema Rectal daily PRN constipation, patient request      OBJECTIVE:  Vital Signs Last 24 Hrs  T(C): 36.6 (18 Mar 2024 04:38), Max: 38 (17 Mar 2024 21:21)  T(F): 97.9 (18 Mar 2024 04:38), Max: 100.4 (17 Mar 2024 21:21)  HR: 68 (18 Mar 2024 04:38) (68 - 86)  BP: 108/60 (18 Mar 2024 04:38) (96/60 - 115/67)  BP(mean): --  RR: 16 (18 Mar 2024 04:38) (16 - 17)  SpO2: 100% (18 Mar 2024 04:38) (98% - 100%)    Parameters below as of 18 Mar 2024 04:38  Patient On (Oxygen Delivery Method): room air        I&O's Summary    16 Mar 2024 07:01  -  17 Mar 2024 07:00  --------------------------------------------------------  IN: 500 mL / OUT: 1900 mL / NET: -1400 mL    17 Mar 2024 07:01  -  18 Mar 2024 06:54  --------------------------------------------------------  IN: 1500 mL / OUT: 3200 mL / NET: -1700 mL        PHYSICAL EXAM:  GENERAL: Laying comfortably, NAD  HEENT: NCAT, PERRLA, EOMI, no scleral icterus, no LAD  NECK: No JVD, supple  LUNG: CTABL; No wheezes, crackles, or rhonchi  HEART: RRR; normal S1/S2; No murmurs, rubs, or gallops  ABDOMEN: +BS, soft, nontender, nondistended, no HSM; No rebound, guarding, or rigidity  EXTREMITIES:  No LE edema b/l, 2+ Peripheral Pulses, No clubbing or cyanosis  NEUROLOGY: AOx3, non-focal, strength 5/5 in all extremities, sensation intact  PSYCH: calm and cooperative  SKIN: No rashes or lesions    LABS:                        6.8    6.77  )-----------( 225      ( 17 Mar 2024 23:22 )             20.6     Auto Eosinophil # x     / Auto Eosinophil % x     / Auto Neutrophil # x     / Auto Neutrophil % x     / BANDS % x                            7.3    6.38  )-----------( 208      ( 17 Mar 2024 07:12 )             21.8     Auto Eosinophil # 0.09  / Auto Eosinophil % 1.4   / Auto Neutrophil # 3.85  / Auto Neutrophil % 60.4  / BANDS % x                            7.2    7.44  )-----------( 208      ( 17 Mar 2024 00:48 )             21.0     Auto Eosinophil # x     / Auto Eosinophil % x     / Auto Neutrophil # x     / Auto Neutrophil % x     / BANDS % x        03-17    134<L>  |  99  |  16  ----------------------------<  148<H>  4.3   |  23  |  0.58  03-16    133<L>  |  99  |  36<H>  ----------------------------<  144<H>  4.6   |  21<L>  |  0.80  03-15    130<L>  |  94<L>  |  51<H>  ----------------------------<  151<H>  4.9   |  23  |  0.98    Ca    9.0      17 Mar 2024 07:12  Ca    8.7      16 Mar 2024 04:25  Ca    8.8      15 Mar 2024 09:50  Phos  3.5     03-17  Mg     2.40     03-17    TPro  6.8  /  Alb  3.3  /  TBili  0.9  /  DBili  x   /  AST  14  /  ALT  11  /  AlkPhos  59  03-17  TPro  6.3  /  Alb  3.2<L>  /  TBili  1.0  /  DBili  x   /  AST  16  /  ALT  12  /  AlkPhos  57  03-16          Urinalysis Basic - ( 17 Mar 2024 07:12 )    Color: x / Appearance: x / SG: x / pH: x  Gluc: 148 mg/dL / Ketone: x  / Bili: x / Urobili: x   Blood: x / Protein: x / Nitrite: x   Leuk Esterase: x / RBC: x / WBC x   Sq Epi: x / Non Sq Epi: x / Bacteria: x          CAPILLARY BLOOD GLUCOSE      POCT Blood Glucose.: 156 mg/dL (17 Mar 2024 21:41)  POCT Blood Glucose.: 160 mg/dL (17 Mar 2024 17:48)  POCT Blood Glucose.: 141 mg/dL (17 Mar 2024 12:27)  POCT Blood Glucose.: 148 mg/dL (17 Mar 2024 08:33)        RADIOLOGY & ADDITIONAL TESTS:     ***************************************************************  Edin Roberts, PGY2  Internal Medicine   TEAMS Preferred  ***************************************************************    SOPHIE URIOSTEGUI  58y  MRN: [id number]  03-15-24 (3d)    Patient is a 58y old  Female who presents with a chief complaint of Hematoma (17 Mar 2024 14:45)      SUBJECTIVE / OVERNIGHT EVENTS:   Overnight patient with drop in Hgb to 6.8, received 1u pRBCs.  Pt seen and examined at bedside. Denies fevers, chills, CP, SOB, Abdominal pain, N/V. Denies any new LLE pain or increased thigh swelling. No new acute complaints.     12 Point ROS negative with the exception of the above    MEDICATIONS  (STANDING):  acetaminophen     Tablet .. 650 milliGRAM(s) Oral every 6 hours  cholecalciferol 5000 Unit(s) Oral daily  dextrose 5%. 1000 milliLiter(s) (50 mL/Hr) IV Continuous <Continuous>  dextrose 5%. 1000 milliLiter(s) (100 mL/Hr) IV Continuous <Continuous>  dextrose 50% Injectable 12.5 Gram(s) IV Push once  dextrose 50% Injectable 25 Gram(s) IV Push once  dextrose 50% Injectable 25 Gram(s) IV Push once  gabapentin 300 milliGRAM(s) Oral three times a day  glucagon  Injectable 1 milliGRAM(s) IntraMuscular once  influenza   Vaccine 0.5 milliLiter(s) IntraMuscular once  insulin lispro (ADMELOG) corrective regimen sliding scale   SubCutaneous three times a day before meals  methocarbamol 750 milliGRAM(s) Oral three times a day  polyethylene glycol 3350 17 Gram(s) Oral daily  senna 2 Tablet(s) Oral at bedtime  traMADol 50 milliGRAM(s) Oral two times a day    MEDICATIONS  (PRN):  dextrose Oral Gel 15 Gram(s) Oral once PRN Blood Glucose LESS THAN 70 milliGRAM(s)/deciliter  saline laxative (FLEET) Rectal Enema 1 Enema Rectal daily PRN constipation, patient request      OBJECTIVE:  Vital Signs Last 24 Hrs  T(C): 36.6 (18 Mar 2024 04:38), Max: 38 (17 Mar 2024 21:21)  T(F): 97.9 (18 Mar 2024 04:38), Max: 100.4 (17 Mar 2024 21:21)  HR: 68 (18 Mar 2024 04:38) (68 - 86)  BP: 108/60 (18 Mar 2024 04:38) (96/60 - 115/67)  BP(mean): --  RR: 16 (18 Mar 2024 04:38) (16 - 17)  SpO2: 100% (18 Mar 2024 04:38) (98% - 100%)    Parameters below as of 18 Mar 2024 04:38  Patient On (Oxygen Delivery Method): room air        I&O's Summary    16 Mar 2024 07:01  -  17 Mar 2024 07:00  --------------------------------------------------------  IN: 500 mL / OUT: 1900 mL / NET: -1400 mL    17 Mar 2024 07:01  -  18 Mar 2024 06:54  --------------------------------------------------------  IN: 1500 mL / OUT: 3200 mL / NET: -1700 mL        PHYSICAL EXAM:  GENERAL: Laying comfortably, NAD  HEENT: NCAT, PERRLA, EOMI, no scleral icterus, no LAD  LUNG: CTABL; No wheezes, crackles, or rhonchi  HEART: RRR; normal S1/S2; No murmurs, rubs, or gallops  ABDOMEN: +BS, soft, nontender, nondistended  EXTREMITIES:  +enlarged left thigh but unchanged in appearance compared to prior, no ecchymosis  NEUROLOGY: AOx3, non-focal, strength 5/5 in all extremities, sensation intact  PSYCH: calm and cooperative  SKIN: No rashes or lesions    LABS:                        8.2    8.20  )-----------( 220      ( 18 Mar 2024 05:55 )             24.3   03-18    136  |  101  |  20  ----------------------------<  114<H>  4.8   |  24  |  0.68    Ca    9.2      18 Mar 2024 05:55  Phos  4.0     03-18  Mg     2.40     03-18    TPro  6.8  /  Alb  3.1<L>  /  TBili  1.0  /  DBili  x   /  AST  12  /  ALT  14  /  AlkPhos  60  03-18                          6.8    6.77  )-----------( 225      ( 17 Mar 2024 23:22 )             20.6     Auto Eosinophil # x     / Auto Eosinophil % x     / Auto Neutrophil # x     / Auto Neutrophil % x     / BANDS % x                            7.3    6.38  )-----------( 208      ( 17 Mar 2024 07:12 )             21.8     Auto Eosinophil # 0.09  / Auto Eosinophil % 1.4   / Auto Neutrophil # 3.85  / Auto Neutrophil % 60.4  / BANDS % x                            7.2    7.44  )-----------( 208      ( 17 Mar 2024 00:48 )             21.0     Auto Eosinophil # x     / Auto Eosinophil % x     / Auto Neutrophil # x     / Auto Neutrophil % x     / BANDS % x        03-17    134<L>  |  99  |  16  ----------------------------<  148<H>  4.3   |  23  |  0.58  03-16    133<L>  |  99  |  36<H>  ----------------------------<  144<H>  4.6   |  21<L>  |  0.80  03-15    130<L>  |  94<L>  |  51<H>  ----------------------------<  151<H>  4.9   |  23  |  0.98    Ca    9.0      17 Mar 2024 07:12  Ca    8.7      16 Mar 2024 04:25  Ca    8.8      15 Mar 2024 09:50  Phos  3.5     03-17  Mg     2.40     03-17    TPro  6.8  /  Alb  3.3  /  TBili  0.9  /  DBili  x   /  AST  14  /  ALT  11  /  AlkPhos  59  03-17  TPro  6.3  /  Alb  3.2<L>  /  TBili  1.0  /  DBili  x   /  AST  16  /  ALT  12  /  AlkPhos  57  03-16          Urinalysis Basic - ( 17 Mar 2024 07:12 )    Color: x / Appearance: x / SG: x / pH: x  Gluc: 148 mg/dL / Ketone: x  / Bili: x / Urobili: x   Blood: x / Protein: x / Nitrite: x   Leuk Esterase: x / RBC: x / WBC x   Sq Epi: x / Non Sq Epi: x / Bacteria: x          CAPILLARY BLOOD GLUCOSE      POCT Blood Glucose.: 156 mg/dL (17 Mar 2024 21:41)  POCT Blood Glucose.: 160 mg/dL (17 Mar 2024 17:48)  POCT Blood Glucose.: 141 mg/dL (17 Mar 2024 12:27)  POCT Blood Glucose.: 148 mg/dL (17 Mar 2024 08:33)        RADIOLOGY & ADDITIONAL TESTS:

## 2024-03-18 NOTE — PROGRESS NOTE ADULT - PROBLEM SELECTOR PLAN 5
DVT ppx: holding iso Hematoma  PT consult DVT ppx: holding iso Hematoma  PT consult - recommending KRUNAL, patient and family still deciding

## 2024-03-18 NOTE — PROGRESS NOTE ADULT - ATTENDING COMMENTS
s/p 5 units PRBC - most recent this AM w appropriate response   exam stable  low grade fever yesterday - likely hematoma related - clinically wo other focus or obvious source  monitor counts today post transfusion and monitor for fever  if counts stable and afebrile, dc planning by michael  keep off AC  PT rec KRUNAL  dw daughter at bedside

## 2024-03-18 NOTE — PROVIDER CONTACT NOTE (OTHER) - BACKGROUND
Patient admitted for anemia
pt admitted for anemia due to acute blood loss

## 2024-03-18 NOTE — PROVIDER CONTACT NOTE (CRITICAL VALUE NOTIFICATION) - ASSESSMENT
Patient in no acute distress.
Patient resting comfortably, no signs of distress noted.
Patient in no acute distress.

## 2024-03-18 NOTE — PROGRESS NOTE ADULT - PROBLEM SELECTOR PLAN 2
Hgb 3 on adm 2/2 hematoma  - s/p 3u pRBCs   - q4 CBC Hgb 3 on adm 2/2 hematoma  - s/p 5u pRBCs   - q4 CBC

## 2024-03-18 NOTE — PROGRESS NOTE ADULT - PROBLEM SELECTOR PLAN 1
LLE Hematoma  - CTA LLE without active bleeding  -  light compression  - hold therapeutic coagulation i/s/o of anemia and IVC filter in place  - no role for surgical intervention on spontaneous hematoma unless concern for compartment syndrome- unable to assess motor and sensation but strong palpable DP pulse, compartments soft - low concern for compartment syndrome; patient remains HD stable  -standing tylenol for pain  -tramadol 50 bid LLE Hematoma  - CTA LLE without active bleeding  -  light compression  - Monitor CBC Q6-8hrs  - hold therapeutic coagulation i/s/o of anemia and IVC filter in place  - low concern for compartment syndrome; patient remains HD stable  -standing tylenol for pain  - tramadol 50 bid  - s/p 5u pRBCs thus far  - If Hgb continues to intermittently decrease, will need to repeat CTA LLE

## 2024-03-18 NOTE — PROVIDER CONTACT NOTE (CRITICAL VALUE NOTIFICATION) - ACTION/TREATMENT ORDERED:
1 Unit PRBCs ordered. Recheck CBC after blood transfusion.
Awaiting further orders.
MD notified, no new interventions at this time.
MD made aware, ordered 1 unit PRBC

## 2024-03-19 LAB
ANION GAP SERPL CALC-SCNC: 10 MMOL/L — SIGNIFICANT CHANGE UP (ref 7–14)
BASOPHILS # BLD AUTO: 0.04 K/UL — SIGNIFICANT CHANGE UP (ref 0–0.2)
BASOPHILS NFR BLD AUTO: 0.5 % — SIGNIFICANT CHANGE UP (ref 0–2)
BUN SERPL-MCNC: 20 MG/DL — SIGNIFICANT CHANGE UP (ref 7–23)
CALCIUM SERPL-MCNC: 9.5 MG/DL — SIGNIFICANT CHANGE UP (ref 8.4–10.5)
CHLORIDE SERPL-SCNC: 100 MMOL/L — SIGNIFICANT CHANGE UP (ref 98–107)
CO2 SERPL-SCNC: 25 MMOL/L — SIGNIFICANT CHANGE UP (ref 22–31)
CREAT SERPL-MCNC: 0.52 MG/DL — SIGNIFICANT CHANGE UP (ref 0.5–1.3)
EGFR: 108 ML/MIN/1.73M2 — SIGNIFICANT CHANGE UP
EOSINOPHIL # BLD AUTO: 0.18 K/UL — SIGNIFICANT CHANGE UP (ref 0–0.5)
EOSINOPHIL NFR BLD AUTO: 2.2 % — SIGNIFICANT CHANGE UP (ref 0–6)
GLUCOSE BLDC GLUCOMTR-MCNC: 107 MG/DL — HIGH (ref 70–99)
GLUCOSE BLDC GLUCOMTR-MCNC: 131 MG/DL — HIGH (ref 70–99)
GLUCOSE BLDC GLUCOMTR-MCNC: 132 MG/DL — HIGH (ref 70–99)
GLUCOSE BLDC GLUCOMTR-MCNC: 153 MG/DL — HIGH (ref 70–99)
GLUCOSE SERPL-MCNC: 125 MG/DL — HIGH (ref 70–99)
HCT VFR BLD CALC: 26.7 % — LOW (ref 34.5–45)
HGB BLD-MCNC: 8.9 G/DL — LOW (ref 11.5–15.5)
IANC: 5.37 K/UL — SIGNIFICANT CHANGE UP (ref 1.8–7.4)
IMM GRANULOCYTES NFR BLD AUTO: 2.7 % — HIGH (ref 0–0.9)
LYMPHOCYTES # BLD AUTO: 1.71 K/UL — SIGNIFICANT CHANGE UP (ref 1–3.3)
LYMPHOCYTES # BLD AUTO: 20.8 % — SIGNIFICANT CHANGE UP (ref 13–44)
MAGNESIUM SERPL-MCNC: 2.2 MG/DL — SIGNIFICANT CHANGE UP (ref 1.6–2.6)
MCHC RBC-ENTMCNC: 29.5 PG — SIGNIFICANT CHANGE UP (ref 27–34)
MCHC RBC-ENTMCNC: 33.3 GM/DL — SIGNIFICANT CHANGE UP (ref 32–36)
MCV RBC AUTO: 88.4 FL — SIGNIFICANT CHANGE UP (ref 80–100)
MONOCYTES # BLD AUTO: 0.7 K/UL — SIGNIFICANT CHANGE UP (ref 0–0.9)
MONOCYTES NFR BLD AUTO: 8.5 % — SIGNIFICANT CHANGE UP (ref 2–14)
NEUTROPHILS # BLD AUTO: 5.37 K/UL — SIGNIFICANT CHANGE UP (ref 1.8–7.4)
NEUTROPHILS NFR BLD AUTO: 65.3 % — SIGNIFICANT CHANGE UP (ref 43–77)
NRBC # BLD: 0 /100 WBCS — SIGNIFICANT CHANGE UP (ref 0–0)
NRBC # FLD: 0 K/UL — SIGNIFICANT CHANGE UP (ref 0–0)
PHOSPHATE SERPL-MCNC: 4.8 MG/DL — HIGH (ref 2.5–4.5)
PLATELET # BLD AUTO: 271 K/UL — SIGNIFICANT CHANGE UP (ref 150–400)
POTASSIUM SERPL-MCNC: 4.6 MMOL/L — SIGNIFICANT CHANGE UP (ref 3.5–5.3)
POTASSIUM SERPL-SCNC: 4.6 MMOL/L — SIGNIFICANT CHANGE UP (ref 3.5–5.3)
RBC # BLD: 3.02 M/UL — LOW (ref 3.8–5.2)
RBC # FLD: 15.4 % — HIGH (ref 10.3–14.5)
SODIUM SERPL-SCNC: 135 MMOL/L — SIGNIFICANT CHANGE UP (ref 135–145)
WBC # BLD: 8.22 K/UL — SIGNIFICANT CHANGE UP (ref 3.8–10.5)
WBC # FLD AUTO: 8.22 K/UL — SIGNIFICANT CHANGE UP (ref 3.8–10.5)

## 2024-03-19 PROCEDURE — 99232 SBSQ HOSP IP/OBS MODERATE 35: CPT | Mod: GC

## 2024-03-19 PROCEDURE — 99222 1ST HOSP IP/OBS MODERATE 55: CPT

## 2024-03-19 RX ORDER — PSYLLIUM SEED (WITH DEXTROSE)
1 POWDER (GRAM) ORAL
Refills: 0 | Status: DISCONTINUED | OUTPATIENT
Start: 2024-03-19 | End: 2024-03-19

## 2024-03-19 RX ADMIN — METHOCARBAMOL 750 MILLIGRAM(S): 500 TABLET, FILM COATED ORAL at 13:01

## 2024-03-19 RX ADMIN — Medication 650 MILLIGRAM(S): at 17:27

## 2024-03-19 RX ADMIN — GABAPENTIN 300 MILLIGRAM(S): 400 CAPSULE ORAL at 21:03

## 2024-03-19 RX ADMIN — METHOCARBAMOL 750 MILLIGRAM(S): 500 TABLET, FILM COATED ORAL at 05:06

## 2024-03-19 RX ADMIN — TRAMADOL HYDROCHLORIDE 50 MILLIGRAM(S): 50 TABLET ORAL at 05:07

## 2024-03-19 RX ADMIN — Medication 5000 UNIT(S): at 13:43

## 2024-03-19 RX ADMIN — GABAPENTIN 300 MILLIGRAM(S): 400 CAPSULE ORAL at 12:47

## 2024-03-19 RX ADMIN — SENNA PLUS 2 TABLET(S): 8.6 TABLET ORAL at 21:03

## 2024-03-19 RX ADMIN — Medication 1 ENEMA: at 08:58

## 2024-03-19 RX ADMIN — GABAPENTIN 300 MILLIGRAM(S): 400 CAPSULE ORAL at 05:07

## 2024-03-19 RX ADMIN — POLYETHYLENE GLYCOL 3350 17 GRAM(S): 17 POWDER, FOR SOLUTION ORAL at 12:47

## 2024-03-19 RX ADMIN — Medication 650 MILLIGRAM(S): at 12:47

## 2024-03-19 RX ADMIN — TRAMADOL HYDROCHLORIDE 50 MILLIGRAM(S): 50 TABLET ORAL at 17:27

## 2024-03-19 RX ADMIN — METHOCARBAMOL 750 MILLIGRAM(S): 500 TABLET, FILM COATED ORAL at 21:03

## 2024-03-19 NOTE — ADVANCED PRACTICE NURSE CONSULT - ASSESSMENT
General: A&Ox4, bedbound, incontinent of urine and stool, external female urinary  in place. Skin warm, dry with increased moisture in intertriginous folds, adequate skin turgor, scattered areas of hyperpigmentation and hypopigmentation.     Left leg elevated on pillow- resolving hematoma.    Sacrum (Within sacral fold) healing Stage 3 pressure injury measuring 1.5cmx0.5cmx0.2cm exposing 20% fibrinous fim and 80% red, moist granular base, mild serosanguinous drainage. No odor. Periwound skin with hypopigmentation. No s/s of soft tissue infection. Goals of care: Autolytic debridement of fibrinous film, moist wound healing.

## 2024-03-19 NOTE — ADVANCED PRACTICE NURSE CONSULT - RECOMMEDATIONS
Recommend follow up care at [facility] ([phone], [address] [place] [address]).     Topical recommendations:   Sacrum- Cleanse with NS, pat dry. Apply Liquid barrier film to periwound skin (allow to dry). Apply Medihoney gel to base of wound, cover with silicone foam with border. Change daily.    Continue low air loss bed therapy,  heel elevation with offloading boots, turn & reposition q2h with Z-flow fluidized pillow, continue moisture management as specified above & single breathable pad, continue measures to decrease friction/shear.   Plan discussed with patient, daughter and primary RN at bedside. Patient and daughter educated on topical wound therapy to optimize wound healing. Questions answered.     Please reconsult if any wound changes or if we can be of further assistance (ext [phone]).  Recommend follow up care at [facility] ([phone], [address] [place] [address]).     Topical recommendations:   Sacrum- Cleanse with NS, pat dry. Apply Liquid barrier film to periwound skin (allow to dry). Apply Medihoney gel to base of wound, cover with silicone foam with border. Change daily.    Continue low air loss bed therapy,  heel elevation with offloading boots, turn & reposition q2h with Z-flow fluidized pillow, continue moisture management as specified above & single breathable pad, continue measures to decrease friction/shear.   Plan discussed with patient, daughter, Primary provider (Nevaeh Coronado) and primary RN at bedside. Patient and daughter educated on topical wound therapy to optimize wound healing. Questions answered.     Please reconsult if any wound changes or if we can be of further assistance (ext [phone]).

## 2024-03-19 NOTE — PROGRESS NOTE ADULT - PROBLEM SELECTOR PLAN 1
LLE Hematoma  - CTA LLE without active bleeding  -  light compression  - Monitor CBC Q6-8hrs  - hold therapeutic coagulation i/s/o of anemia and IVC filter in place  - low concern for compartment syndrome; patient remains HD stable  -standing tylenol for pain  - tramadol 50 bid  - s/p 5u pRBCs thus far  - If Hgb continues to intermittently decrease, will need to repeat CTA LLE

## 2024-03-19 NOTE — PROGRESS NOTE ADULT - ATTENDING COMMENTS
counts stable, afebrile  stable for dc   awaiting PMR in [place] vs KRUNAL    dw daughter at bedside   dw SW and CM
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
## 2024-03-19 NOTE — PROGRESS NOTE ADULT - PROBLEM SELECTOR PLAN 5
DVT ppx: holding iso Hematoma  PT consult - recommending KRUNAL, patient and family still deciding DVT ppx: holding iso Hematoma  PT consult - recommending Acute Rehab, patient and family still deciding; PM&R cnslted

## 2024-03-20 LAB
ALBUMIN SERPL ELPH-MCNC: 3.1 G/DL — LOW (ref 3.3–5)
ALP SERPL-CCNC: 65 U/L — SIGNIFICANT CHANGE UP (ref 40–120)
ALT FLD-CCNC: 15 U/L — SIGNIFICANT CHANGE UP (ref 4–33)
ANION GAP SERPL CALC-SCNC: 12 MMOL/L — SIGNIFICANT CHANGE UP (ref 7–14)
AST SERPL-CCNC: 14 U/L — SIGNIFICANT CHANGE UP (ref 4–32)
BASOPHILS # BLD AUTO: 0.03 K/UL — SIGNIFICANT CHANGE UP (ref 0–0.2)
BASOPHILS NFR BLD AUTO: 0.3 % — SIGNIFICANT CHANGE UP (ref 0–2)
BILIRUB SERPL-MCNC: 1 MG/DL — SIGNIFICANT CHANGE UP (ref 0.2–1.2)
BUN SERPL-MCNC: 18 MG/DL — SIGNIFICANT CHANGE UP (ref 7–23)
CALCIUM SERPL-MCNC: 9.7 MG/DL — SIGNIFICANT CHANGE UP (ref 8.4–10.5)
CHLORIDE SERPL-SCNC: 101 MMOL/L — SIGNIFICANT CHANGE UP (ref 98–107)
CO2 SERPL-SCNC: 23 MMOL/L — SIGNIFICANT CHANGE UP (ref 22–31)
CREAT SERPL-MCNC: 0.5 MG/DL — SIGNIFICANT CHANGE UP (ref 0.5–1.3)
EGFR: 109 ML/MIN/1.73M2 — SIGNIFICANT CHANGE UP
EOSINOPHIL # BLD AUTO: 0.19 K/UL — SIGNIFICANT CHANGE UP (ref 0–0.5)
EOSINOPHIL NFR BLD AUTO: 2.2 % — SIGNIFICANT CHANGE UP (ref 0–6)
GLUCOSE BLDC GLUCOMTR-MCNC: 130 MG/DL — HIGH (ref 70–99)
GLUCOSE BLDC GLUCOMTR-MCNC: 134 MG/DL — HIGH (ref 70–99)
GLUCOSE BLDC GLUCOMTR-MCNC: 139 MG/DL — HIGH (ref 70–99)
GLUCOSE BLDC GLUCOMTR-MCNC: 98 MG/DL — SIGNIFICANT CHANGE UP (ref 70–99)
GLUCOSE SERPL-MCNC: 128 MG/DL — HIGH (ref 70–99)
HCT VFR BLD CALC: 27.8 % — LOW (ref 34.5–45)
HGB BLD-MCNC: 9.1 G/DL — LOW (ref 11.5–15.5)
IANC: 5.53 K/UL — SIGNIFICANT CHANGE UP (ref 1.8–7.4)
IMM GRANULOCYTES NFR BLD AUTO: 3.1 % — HIGH (ref 0–0.9)
LYMPHOCYTES # BLD AUTO: 1.89 K/UL — SIGNIFICANT CHANGE UP (ref 1–3.3)
LYMPHOCYTES # BLD AUTO: 21.9 % — SIGNIFICANT CHANGE UP (ref 13–44)
MAGNESIUM SERPL-MCNC: 2.1 MG/DL — SIGNIFICANT CHANGE UP (ref 1.6–2.6)
MCHC RBC-ENTMCNC: 29.3 PG — SIGNIFICANT CHANGE UP (ref 27–34)
MCHC RBC-ENTMCNC: 32.7 GM/DL — SIGNIFICANT CHANGE UP (ref 32–36)
MCV RBC AUTO: 89.4 FL — SIGNIFICANT CHANGE UP (ref 80–100)
MONOCYTES # BLD AUTO: 0.73 K/UL — SIGNIFICANT CHANGE UP (ref 0–0.9)
MONOCYTES NFR BLD AUTO: 8.4 % — SIGNIFICANT CHANGE UP (ref 2–14)
NEUTROPHILS # BLD AUTO: 5.53 K/UL — SIGNIFICANT CHANGE UP (ref 1.8–7.4)
NEUTROPHILS NFR BLD AUTO: 64.1 % — SIGNIFICANT CHANGE UP (ref 43–77)
NRBC # BLD: 0 /100 WBCS — SIGNIFICANT CHANGE UP (ref 0–0)
NRBC # FLD: 0 K/UL — SIGNIFICANT CHANGE UP (ref 0–0)
PHOSPHATE SERPL-MCNC: 4.5 MG/DL — SIGNIFICANT CHANGE UP (ref 2.5–4.5)
PLATELET # BLD AUTO: 297 K/UL — SIGNIFICANT CHANGE UP (ref 150–400)
POTASSIUM SERPL-MCNC: 4.4 MMOL/L — SIGNIFICANT CHANGE UP (ref 3.5–5.3)
POTASSIUM SERPL-SCNC: 4.4 MMOL/L — SIGNIFICANT CHANGE UP (ref 3.5–5.3)
PROT SERPL-MCNC: 7.1 G/DL — SIGNIFICANT CHANGE UP (ref 6–8.3)
RBC # BLD: 3.11 M/UL — LOW (ref 3.8–5.2)
RBC # FLD: 15 % — HIGH (ref 10.3–14.5)
SODIUM SERPL-SCNC: 136 MMOL/L — SIGNIFICANT CHANGE UP (ref 135–145)
WBC # BLD: 8.64 K/UL — SIGNIFICANT CHANGE UP (ref 3.8–10.5)
WBC # FLD AUTO: 8.64 K/UL — SIGNIFICANT CHANGE UP (ref 3.8–10.5)

## 2024-03-20 PROCEDURE — 99232 SBSQ HOSP IP/OBS MODERATE 35: CPT

## 2024-03-20 PROCEDURE — 99232 SBSQ HOSP IP/OBS MODERATE 35: CPT | Mod: GC

## 2024-03-20 RX ORDER — CYCLOBENZAPRINE HYDROCHLORIDE 10 MG/1
5 TABLET, FILM COATED ORAL ONCE
Refills: 0 | Status: COMPLETED | OUTPATIENT
Start: 2024-03-20 | End: 2024-03-20

## 2024-03-20 RX ADMIN — Medication 650 MILLIGRAM(S): at 23:10

## 2024-03-20 RX ADMIN — Medication 1 ENEMA: at 09:15

## 2024-03-20 RX ADMIN — METHOCARBAMOL 750 MILLIGRAM(S): 500 TABLET, FILM COATED ORAL at 05:47

## 2024-03-20 RX ADMIN — Medication 650 MILLIGRAM(S): at 18:13

## 2024-03-20 RX ADMIN — TRAMADOL HYDROCHLORIDE 50 MILLIGRAM(S): 50 TABLET ORAL at 18:13

## 2024-03-20 RX ADMIN — GABAPENTIN 300 MILLIGRAM(S): 400 CAPSULE ORAL at 05:47

## 2024-03-20 RX ADMIN — CYCLOBENZAPRINE HYDROCHLORIDE 5 MILLIGRAM(S): 10 TABLET, FILM COATED ORAL at 03:01

## 2024-03-20 RX ADMIN — TRAMADOL HYDROCHLORIDE 50 MILLIGRAM(S): 50 TABLET ORAL at 05:47

## 2024-03-20 RX ADMIN — Medication 5000 UNIT(S): at 13:47

## 2024-03-20 RX ADMIN — GABAPENTIN 300 MILLIGRAM(S): 400 CAPSULE ORAL at 21:43

## 2024-03-20 RX ADMIN — POLYETHYLENE GLYCOL 3350 17 GRAM(S): 17 POWDER, FOR SOLUTION ORAL at 13:47

## 2024-03-20 RX ADMIN — METHOCARBAMOL 750 MILLIGRAM(S): 500 TABLET, FILM COATED ORAL at 13:46

## 2024-03-20 RX ADMIN — METHOCARBAMOL 750 MILLIGRAM(S): 500 TABLET, FILM COATED ORAL at 21:42

## 2024-03-20 RX ADMIN — Medication 650 MILLIGRAM(S): at 05:50

## 2024-03-20 RX ADMIN — GABAPENTIN 300 MILLIGRAM(S): 400 CAPSULE ORAL at 13:46

## 2024-03-20 RX ADMIN — SENNA PLUS 2 TABLET(S): 8.6 TABLET ORAL at 21:43

## 2024-03-20 RX ADMIN — Medication 650 MILLIGRAM(S): at 13:47

## 2024-03-20 NOTE — OCCUPATIONAL THERAPY INITIAL EVALUATION ADULT - PLANNED THERAPY INTERVENTIONS, OT EVAL
ADL retraining/bed mobility training/neuromuscular re-education/orthotic fitting/training/ROM/strengthening

## 2024-03-20 NOTE — PROGRESS NOTE ADULT - PROBLEM SELECTOR PLAN 5
DVT ppx: holding iso Hematoma  PT consult - recommending Acute Rehab, patient and family still deciding; PM&R cnslted

## 2024-03-20 NOTE — PROGRESS NOTE ADULT - SUBJECTIVE AND OBJECTIVE BOX
***************************************************************  Orlin Cordova, PGY1  Internal Medicine   TEAMS Preferred  ***************************************************************    SOPHIE URIOSTEGUI  58y  MRN: [id number]    Patient is a 58y old  Female who presents with a chief complaint of Hematoma       SUBJECTIVE / OVERNIGHT EVENTS:   DILLAN OVN;   Pt seen and examined at bedside. Denies fevers, chills, CP, SOB, Abdominal pain, N/V. Denies any new LLE pain or increased thigh swelling. No new acute complaints.     12 Point ROS negative with the exception of the above    MEDICATIONS  (STANDING):  acetaminophen     Tablet .. 650 milliGRAM(s) Oral every 6 hours  cholecalciferol 5000 Unit(s) Oral daily  dextrose 5%. 1000 milliLiter(s) (50 mL/Hr) IV Continuous <Continuous>  dextrose 5%. 1000 milliLiter(s) (100 mL/Hr) IV Continuous <Continuous>  dextrose 50% Injectable 12.5 Gram(s) IV Push once  dextrose 50% Injectable 25 Gram(s) IV Push once  dextrose 50% Injectable 25 Gram(s) IV Push once  gabapentin 300 milliGRAM(s) Oral three times a day  glucagon  Injectable 1 milliGRAM(s) IntraMuscular once  influenza   Vaccine 0.5 milliLiter(s) IntraMuscular once  insulin lispro (ADMELOG) corrective regimen sliding scale   SubCutaneous three times a day before meals  methocarbamol 750 milliGRAM(s) Oral three times a day  polyethylene glycol 3350 17 Gram(s) Oral daily  senna 2 Tablet(s) Oral at bedtime  traMADol 50 milliGRAM(s) Oral two times a day    MEDICATIONS  (PRN):  dextrose Oral Gel 15 Gram(s) Oral once PRN Blood Glucose LESS THAN 70 milliGRAM(s)/deciliter  saline laxative (FLEET) Rectal Enema 1 Enema Rectal daily PRN constipation, patient request      OBJECTIVE:  Vital Signs Last 24 Hrs  T(C): 36.8 (20 Mar 2024 05:02), Max: 36.8 (19 Mar 2024 22:06)  T(F): 98.2 (20 Mar 2024 05:02), Max: 98.3 (19 Mar 2024 22:06)  HR: 60 (20 Mar 2024 05:02) (60 - 66)  BP: 118/69 (20 Mar 2024 05:02) (105/65 - 122/74)  BP(mean): --  RR: 17 (20 Mar 2024 05:02) (16 - 18)  SpO2: 100% (20 Mar 2024 05:02) (100% - 100%)    Parameters below as of 20 Mar 2024 05:02  Patient On (Oxygen Delivery Method): room air    PHYSICAL EXAM:  GENERAL: Laying comfortably, NAD  HEENT: NCAT, PERRLA, EOMI, no scleral icterus, no LAD  LUNG: CTABL; No wheezes, crackles, or rhonchi  HEART: RRR; normal S1/S2; No murmurs, rubs, or gallops  ABDOMEN: +BS, soft, nontender, nondistended  EXTREMITIES:  +enlarged left thigh but unchanged in appearance compared to prior, no ecchymosis  NEUROLOGY: AOx3, non-focal, strength 5/5 in all extremities, sensation intact  PSYCH: calm and cooperative  SKIN: No rashes or lesions    LABS:                        9.1    8.64  )-----------( 297      ( 20 Mar 2024 05:49 )             27.8     20 Mar 2024 05:49    136    |  101    |  18     ----------------------------<  128    4.4     |  23     |  0.50     Ca    9.7        20 Mar 2024 05:49  Phos  4.5       20 Mar 2024 05:49  Mg     2.10      20 Mar 2024 05:49    TPro  7.1    /  Alb  3.1    /  TBili  1.0    /  DBili  x      /  AST  14     /  ALT  15     /  AlkPhos  65     20 Mar 2024 05:49      Urinalysis Basic - ( 17 Mar 2024 07:12 )    Color: x / Appearance: x / SG: x / pH: x  Gluc: 148 mg/dL / Ketone: x  / Bili: x / Urobili: x   Blood: x / Protein: x / Nitrite: x   Leuk Esterase: x / RBC: x / WBC x   Sq Epi: x / Non Sq Epi: x / Bacteria: x      CAPILLARY BLOOD GLUCOSE      POCT Blood Glucose.: 156 mg/dL (17 Mar 2024 21:41)  POCT Blood Glucose.: 160 mg/dL (17 Mar 2024 17:48)  POCT Blood Glucose.: 141 mg/dL (17 Mar 2024 12:27)  POCT Blood Glucose.: 148 mg/dL (17 Mar 2024 08:33)        RADIOLOGY & ADDITIONAL TESTS:
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
## 2024-03-20 NOTE — PROGRESS NOTE ADULT - ATTENDING COMMENTS
counts and exam stable  afebrile  wound care done  PMR rec AR  stable for dc pending rehab set up  dw daughter at bedside

## 2024-03-20 NOTE — OCCUPATIONAL THERAPY INITIAL EVALUATION ADULT - GENERAL OBSERVATIONS, REHAB EVAL
Patient found semi-reclined in bed, NAD, and able to follow directions. Patient agreeable to participate in skilled OT evaluation.

## 2024-03-20 NOTE — OCCUPATIONAL THERAPY INITIAL EVALUATION ADULT - RANGE OF MOTION EXAMINATION, UPPER EXTREMITY
pt displays moderate active ROM in both shoulders and elbows and severe limitations to both hands/wrists actively/bilateral UE Passive ROM was WFL  (within functional limits)

## 2024-03-20 NOTE — OCCUPATIONAL THERAPY INITIAL EVALUATION ADULT - LOWER BODY DRESSING, PREVIOUS LEVEL OF FUNCTION, OT EVAL
Infusion Nursing Note:  Quiana Dunaway presents today for Velcade SQ chemotherapy.    Patient seen by provider today: No   present during visit today: Not Applicable.    Note: Per pt, feeling nauseous today. Have prn anti emetics at home, otherwise pt is okay.    Intravenous Access:  Lab draw site Right FA, Needle type; butterfly, Gauge 23.  Lab draw with difficulty, attempted x 2. 2nd time done by Amanda Howe RN with no difficulty    Treatment Conditions:  Lab Results   Component Value Date    HGB 10.3 09/25/2017     Lab Results   Component Value Date    WBC 2.5 09/25/2017      Lab Results   Component Value Date    ANEU 1.2 09/25/2017     Lab Results   Component Value Date     09/25/2017      Results reviewed, labs MET treatment parameters, ok to proceed with treatment.  Teaching done to pt and daughter re: handwashing and other ways to prevent infection d/t ANC 1.2. Pt and daughter verbalized understanding.   Velcade SQ given in the abdomen without difficulty. Site clean dry and intact.     Post Infusion Assessment:  Not applicable.    Discharge Plan:   Patient declined prescription refills. Per pt and daughter will  decadron and chemotheraphy   Discharge instructions reviewed with: Patient and daighter.  Patient and/or family verbalized understanding of discharge instructions and all questions answered.  Copy of AVS reviewed with patient and/or family.  Patient will return 10/02/2017 for next appointment.  Patient discharged in stable condition accompanied by: self and daughter.  Departure Mode: Ambulatory.    Janeth Luis RN                      
unable to perform

## 2024-03-21 DIAGNOSIS — R74.01 ELEVATION OF LEVELS OF LIVER TRANSAMINASE LEVELS: ICD-10-CM

## 2024-03-21 LAB
ALBUMIN SERPL ELPH-MCNC: 3.5 G/DL — SIGNIFICANT CHANGE UP (ref 3.3–5)
ALBUMIN SERPL ELPH-MCNC: 3.5 G/DL — SIGNIFICANT CHANGE UP (ref 3.3–5)
ALP SERPL-CCNC: 200 U/L — HIGH (ref 40–120)
ALP SERPL-CCNC: 74 U/L — SIGNIFICANT CHANGE UP (ref 40–120)
ALT FLD-CCNC: 15 U/L — SIGNIFICANT CHANGE UP (ref 4–33)
ALT FLD-CCNC: 24 U/L — SIGNIFICANT CHANGE UP (ref 4–33)
ANION GAP SERPL CALC-SCNC: 11 MMOL/L — SIGNIFICANT CHANGE UP (ref 7–14)
ANION GAP SERPL CALC-SCNC: 11 MMOL/L — SIGNIFICANT CHANGE UP (ref 7–14)
AST SERPL-CCNC: 15 U/L — SIGNIFICANT CHANGE UP (ref 4–32)
AST SERPL-CCNC: 53 U/L — HIGH (ref 4–32)
BASOPHILS # BLD AUTO: 0.03 K/UL — SIGNIFICANT CHANGE UP (ref 0–0.2)
BASOPHILS NFR BLD AUTO: 0.4 % — SIGNIFICANT CHANGE UP (ref 0–2)
BILIRUB SERPL-MCNC: 1.1 MG/DL — SIGNIFICANT CHANGE UP (ref 0.2–1.2)
BILIRUB SERPL-MCNC: 1.9 MG/DL — HIGH (ref 0.2–1.2)
BUN SERPL-MCNC: 22 MG/DL — SIGNIFICANT CHANGE UP (ref 7–23)
BUN SERPL-MCNC: 28 MG/DL — HIGH (ref 7–23)
CALCIUM SERPL-MCNC: 10 MG/DL — SIGNIFICANT CHANGE UP (ref 8.4–10.5)
CALCIUM SERPL-MCNC: 9.4 MG/DL — SIGNIFICANT CHANGE UP (ref 8.4–10.5)
CHLORIDE SERPL-SCNC: 102 MMOL/L — SIGNIFICANT CHANGE UP (ref 98–107)
CHLORIDE SERPL-SCNC: 99 MMOL/L — SIGNIFICANT CHANGE UP (ref 98–107)
CO2 SERPL-SCNC: 22 MMOL/L — SIGNIFICANT CHANGE UP (ref 22–31)
CO2 SERPL-SCNC: 26 MMOL/L — SIGNIFICANT CHANGE UP (ref 22–31)
CREAT SERPL-MCNC: 0.51 MG/DL — SIGNIFICANT CHANGE UP (ref 0.5–1.3)
CREAT SERPL-MCNC: 0.98 MG/DL — SIGNIFICANT CHANGE UP (ref 0.5–1.3)
EGFR: 109 ML/MIN/1.73M2 — SIGNIFICANT CHANGE UP
EGFR: 67 ML/MIN/1.73M2 — SIGNIFICANT CHANGE UP
EOSINOPHIL # BLD AUTO: 0.22 K/UL — SIGNIFICANT CHANGE UP (ref 0–0.5)
EOSINOPHIL NFR BLD AUTO: 3.1 % — SIGNIFICANT CHANGE UP (ref 0–6)
GLUCOSE BLDC GLUCOMTR-MCNC: 109 MG/DL — HIGH (ref 70–99)
GLUCOSE BLDC GLUCOMTR-MCNC: 121 MG/DL — HIGH (ref 70–99)
GLUCOSE BLDC GLUCOMTR-MCNC: 145 MG/DL — HIGH (ref 70–99)
GLUCOSE BLDC GLUCOMTR-MCNC: 97 MG/DL — SIGNIFICANT CHANGE UP (ref 70–99)
GLUCOSE SERPL-MCNC: 108 MG/DL — HIGH (ref 70–99)
GLUCOSE SERPL-MCNC: 187 MG/DL — HIGH (ref 70–99)
HCT VFR BLD CALC: 29.5 % — LOW (ref 34.5–45)
HCT VFR BLD CALC: 30.8 % — LOW (ref 34.5–45)
HGB BLD-MCNC: 10 G/DL — LOW (ref 11.5–15.5)
HGB BLD-MCNC: 10 G/DL — LOW (ref 11.5–15.5)
IANC: 4.8 K/UL — SIGNIFICANT CHANGE UP (ref 1.8–7.4)
IMM GRANULOCYTES NFR BLD AUTO: 0.4 % — SIGNIFICANT CHANGE UP (ref 0–0.9)
LG PLATELETS BLD QL AUTO: SIGNIFICANT CHANGE UP
LYMPHOCYTES # BLD AUTO: 1.19 K/UL — SIGNIFICANT CHANGE UP (ref 1–3.3)
LYMPHOCYTES # BLD AUTO: 16.9 % — SIGNIFICANT CHANGE UP (ref 13–44)
MAGNESIUM SERPL-MCNC: 1.6 MG/DL — SIGNIFICANT CHANGE UP (ref 1.6–2.6)
MAGNESIUM SERPL-MCNC: 2.7 MG/DL — HIGH (ref 1.6–2.6)
MCHC RBC-ENTMCNC: 22.2 PG — LOW (ref 27–34)
MCHC RBC-ENTMCNC: 29.2 PG — SIGNIFICANT CHANGE UP (ref 27–34)
MCHC RBC-ENTMCNC: 32.5 GM/DL — SIGNIFICANT CHANGE UP (ref 32–36)
MCHC RBC-ENTMCNC: 33.9 GM/DL — SIGNIFICANT CHANGE UP (ref 32–36)
MCV RBC AUTO: 65.6 FL — LOW (ref 80–100)
MCV RBC AUTO: 89.8 FL — SIGNIFICANT CHANGE UP (ref 80–100)
MONOCYTES # BLD AUTO: 0.79 K/UL — SIGNIFICANT CHANGE UP (ref 0–0.9)
MONOCYTES NFR BLD AUTO: 11.2 % — SIGNIFICANT CHANGE UP (ref 2–14)
NEUTROPHILS # BLD AUTO: 4.8 K/UL — SIGNIFICANT CHANGE UP (ref 1.8–7.4)
NEUTROPHILS NFR BLD AUTO: 68 % — SIGNIFICANT CHANGE UP (ref 43–77)
NRBC # BLD: 0 /100 WBCS — SIGNIFICANT CHANGE UP (ref 0–0)
NRBC # BLD: 0 /100 WBCS — SIGNIFICANT CHANGE UP (ref 0–0)
NRBC # FLD: 0 K/UL — SIGNIFICANT CHANGE UP (ref 0–0)
NRBC # FLD: 0 K/UL — SIGNIFICANT CHANGE UP (ref 0–0)
PHOSPHATE SERPL-MCNC: 3.4 MG/DL — SIGNIFICANT CHANGE UP (ref 2.5–4.5)
PHOSPHATE SERPL-MCNC: 4.4 MG/DL — SIGNIFICANT CHANGE UP (ref 2.5–4.5)
PLAT MORPH BLD: ABNORMAL
PLATELET # BLD AUTO: 357 K/UL — SIGNIFICANT CHANGE UP (ref 150–400)
PLATELET # BLD AUTO: 90 K/UL — LOW (ref 150–400)
PLATELET COUNT - ESTIMATE: ABNORMAL
POTASSIUM SERPL-MCNC: 4.3 MMOL/L — SIGNIFICANT CHANGE UP (ref 3.5–5.3)
POTASSIUM SERPL-MCNC: 4.5 MMOL/L — SIGNIFICANT CHANGE UP (ref 3.5–5.3)
POTASSIUM SERPL-SCNC: 4.3 MMOL/L — SIGNIFICANT CHANGE UP (ref 3.5–5.3)
POTASSIUM SERPL-SCNC: 4.5 MMOL/L — SIGNIFICANT CHANGE UP (ref 3.5–5.3)
PROT SERPL-MCNC: 7.7 G/DL — SIGNIFICANT CHANGE UP (ref 6–8.3)
PROT SERPL-MCNC: 7.8 G/DL — SIGNIFICANT CHANGE UP (ref 6–8.3)
RBC # BLD: 3.43 M/UL — LOW (ref 3.8–5.2)
RBC # BLD: 4.5 M/UL — SIGNIFICANT CHANGE UP (ref 3.8–5.2)
RBC # FLD: 14.6 % — HIGH (ref 10.3–14.5)
RBC # FLD: 17.5 % — HIGH (ref 10.3–14.5)
RBC BLD AUTO: SIGNIFICANT CHANGE UP
SODIUM SERPL-SCNC: 132 MMOL/L — LOW (ref 135–145)
SODIUM SERPL-SCNC: 139 MMOL/L — SIGNIFICANT CHANGE UP (ref 135–145)
WBC # BLD: 6.71 K/UL — SIGNIFICANT CHANGE UP (ref 3.8–10.5)
WBC # BLD: 7.06 K/UL — SIGNIFICANT CHANGE UP (ref 3.8–10.5)
WBC # FLD AUTO: 6.71 K/UL — SIGNIFICANT CHANGE UP (ref 3.8–10.5)
WBC # FLD AUTO: 7.06 K/UL — SIGNIFICANT CHANGE UP (ref 3.8–10.5)

## 2024-03-21 PROCEDURE — 99232 SBSQ HOSP IP/OBS MODERATE 35: CPT

## 2024-03-21 PROCEDURE — 76705 ECHO EXAM OF ABDOMEN: CPT | Mod: 26

## 2024-03-21 PROCEDURE — 99232 SBSQ HOSP IP/OBS MODERATE 35: CPT | Mod: GC

## 2024-03-21 RX ORDER — MAGNESIUM SULFATE 500 MG/ML
2 VIAL (ML) INJECTION ONCE
Refills: 0 | Status: COMPLETED | OUTPATIENT
Start: 2024-03-21 | End: 2024-03-21

## 2024-03-21 RX ADMIN — TRAMADOL HYDROCHLORIDE 50 MILLIGRAM(S): 50 TABLET ORAL at 17:37

## 2024-03-21 RX ADMIN — TRAMADOL HYDROCHLORIDE 50 MILLIGRAM(S): 50 TABLET ORAL at 05:46

## 2024-03-21 RX ADMIN — SENNA PLUS 2 TABLET(S): 8.6 TABLET ORAL at 21:36

## 2024-03-21 RX ADMIN — Medication 5000 UNIT(S): at 13:17

## 2024-03-21 RX ADMIN — METHOCARBAMOL 750 MILLIGRAM(S): 500 TABLET, FILM COATED ORAL at 21:36

## 2024-03-21 RX ADMIN — Medication 650 MILLIGRAM(S): at 13:16

## 2024-03-21 RX ADMIN — Medication 650 MILLIGRAM(S): at 17:37

## 2024-03-21 RX ADMIN — METHOCARBAMOL 750 MILLIGRAM(S): 500 TABLET, FILM COATED ORAL at 13:18

## 2024-03-21 RX ADMIN — Medication 25 GRAM(S): at 09:33

## 2024-03-21 RX ADMIN — METHOCARBAMOL 750 MILLIGRAM(S): 500 TABLET, FILM COATED ORAL at 05:47

## 2024-03-21 RX ADMIN — GABAPENTIN 300 MILLIGRAM(S): 400 CAPSULE ORAL at 05:46

## 2024-03-21 RX ADMIN — POLYETHYLENE GLYCOL 3350 17 GRAM(S): 17 POWDER, FOR SOLUTION ORAL at 13:17

## 2024-03-21 RX ADMIN — GABAPENTIN 300 MILLIGRAM(S): 400 CAPSULE ORAL at 21:36

## 2024-03-21 RX ADMIN — Medication 650 MILLIGRAM(S): at 05:47

## 2024-03-21 RX ADMIN — GABAPENTIN 300 MILLIGRAM(S): 400 CAPSULE ORAL at 13:17

## 2024-03-21 NOTE — PROGRESS NOTE ADULT - PROBLEM SELECTOR PLAN 5
DVT ppx: holding iso Hematoma  PT consult - recommending Acute Rehab, patient and family still deciding; PM&R cnslted Bump in LFTs ALP >200
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
## 2024-03-21 NOTE — PROGRESS NOTE ADULT - SUBJECTIVE AND OBJECTIVE BOX
***************************************************************  Orlin Cordova, PGY1  Internal Medicine   TEAMS Preferred  ***************************************************************    SOPHIE URIOSTEGUI  58y  MRN: [id number]    Patient is a 58y old  Female who presents with a chief complaint of Hematoma       SUBJECTIVE / OVERNIGHT EVENTS:   DILLAN OVN;   Pt seen and examined at bedside. Denies fevers, chills, CP, SOB, Abdominal pain, N/V. Denies any new LLE pain or increased thigh swelling. No new acute complaints.     12 Point ROS negative with the exception of the above    MEDICATIONS  (STANDING):  acetaminophen     Tablet .. 650 milliGRAM(s) Oral every 6 hours  cholecalciferol 5000 Unit(s) Oral daily  dextrose 5%. 1000 milliLiter(s) (50 mL/Hr) IV Continuous <Continuous>  dextrose 5%. 1000 milliLiter(s) (100 mL/Hr) IV Continuous <Continuous>  dextrose 50% Injectable 12.5 Gram(s) IV Push once  dextrose 50% Injectable 25 Gram(s) IV Push once  dextrose 50% Injectable 25 Gram(s) IV Push once  gabapentin 300 milliGRAM(s) Oral three times a day  glucagon  Injectable 1 milliGRAM(s) IntraMuscular once  influenza   Vaccine 0.5 milliLiter(s) IntraMuscular once  insulin lispro (ADMELOG) corrective regimen sliding scale   SubCutaneous three times a day before meals  methocarbamol 750 milliGRAM(s) Oral three times a day  polyethylene glycol 3350 17 Gram(s) Oral daily  senna 2 Tablet(s) Oral at bedtime  traMADol 50 milliGRAM(s) Oral two times a day    MEDICATIONS  (PRN):  dextrose Oral Gel 15 Gram(s) Oral once PRN Blood Glucose LESS THAN 70 milliGRAM(s)/deciliter  saline laxative (FLEET) Rectal Enema 1 Enema Rectal daily PRN constipation, patient request      OBJECTIVE:  Vital Signs Last 24 Hrs  T(C): 36.4 (21 Mar 2024 05:24), Max: 36.5 (20 Mar 2024 12:41)  T(F): 97.5 (21 Mar 2024 05:24), Max: 97.7 (20 Mar 2024 12:41)  HR: 63 (21 Mar 2024 05:24) (62 - 77)  BP: 132/78 (21 Mar 2024 05:24) (109/71 - 132/78)  BP(mean): --  RR: 17 (21 Mar 2024 05:24) (17 - 18)  SpO2: 100% (21 Mar 2024 05:24) (100% - 100%)    Parameters below as of 21 Mar 2024 05:24  Patient On (Oxygen Delivery Method): room air    PHYSICAL EXAM:  GENERAL: Laying comfortably, NAD  HEENT: NCAT, PERRLA, EOMI, no scleral icterus, no LAD  LUNG: CTABL; No wheezes, crackles, or rhonchi  HEART: RRR; normal S1/S2; No murmurs, rubs, or gallops  ABDOMEN: +BS, soft, nontender, nondistended  EXTREMITIES:  +enlarged left thigh but unchanged in appearance compared to prior, no ecchymosis  NEUROLOGY: AOx3, non-focal, strength 5/5 in all extremities, sensation intact  PSYCH: calm and cooperative  SKIN: No rashes or lesions    LABS:                        10.0   x     )-----------( x        ( 21 Mar 2024 06:27 )             29.5     21 Mar 2024 06:27    132    |  99     |  28     ----------------------------<  187    4.5     |  22     |  0.98     Ca    9.4        21 Mar 2024 06:27  Phos  3.4       21 Mar 2024 06:27  Mg     1.60      21 Mar 2024 06:27    TPro  7.8    /  Alb  3.5    /  TBili  1.9    /  DBili  x      /  AST  53     /  ALT  24     /  AlkPhos  200    21 Mar 2024 06:27      Urinalysis Basic - ( 17 Mar 2024 07:12 )    Color: x / Appearance: x / SG: x / pH: x  Gluc: 148 mg/dL / Ketone: x  / Bili: x / Urobili: x   Blood: x / Protein: x / Nitrite: x   Leuk Esterase: x / RBC: x / WBC x   Sq Epi: x / Non Sq Epi: x / Bacteria: x      CAPILLARY BLOOD GLUCOSE      POCT Blood Glucose.: 156 mg/dL (17 Mar 2024 21:41)  POCT Blood Glucose.: 160 mg/dL (17 Mar 2024 17:48)  POCT Blood Glucose.: 141 mg/dL (17 Mar 2024 12:27)  POCT Blood Glucose.: 148 mg/dL (17 Mar 2024 08:33)        RADIOLOGY & ADDITIONAL TESTS:

## 2024-03-21 NOTE — PROGRESS NOTE ADULT - ATTENDING COMMENTS
clinically at baseline, though noted a drop in plt to 90 today as well as new hyperbilirubinemia w elevated alk phos  pt not on hep products, afebrile, no neuro changes, renal fx ok   prior CT wo GS or biliary dilation  for now repeating labs to assure stable, check RUQ sono  further work up pending repeat labs  hgb stable otherwise and plan is for rehab - having PMR clarify candidacy for acute     dw family at bedside

## 2024-03-22 LAB
ALBUMIN SERPL ELPH-MCNC: 3.2 G/DL — LOW (ref 3.3–5)
ALP SERPL-CCNC: 65 U/L — SIGNIFICANT CHANGE UP (ref 40–120)
ALT FLD-CCNC: 14 U/L — SIGNIFICANT CHANGE UP (ref 4–33)
ANION GAP SERPL CALC-SCNC: 12 MMOL/L — SIGNIFICANT CHANGE UP (ref 7–14)
AST SERPL-CCNC: 15 U/L — SIGNIFICANT CHANGE UP (ref 4–32)
BASOPHILS # BLD AUTO: 0.04 K/UL — SIGNIFICANT CHANGE UP (ref 0–0.2)
BASOPHILS NFR BLD AUTO: 0.6 % — SIGNIFICANT CHANGE UP (ref 0–2)
BILIRUB SERPL-MCNC: 0.8 MG/DL — SIGNIFICANT CHANGE UP (ref 0.2–1.2)
BUN SERPL-MCNC: 22 MG/DL — SIGNIFICANT CHANGE UP (ref 7–23)
CALCIUM SERPL-MCNC: 9.6 MG/DL — SIGNIFICANT CHANGE UP (ref 8.4–10.5)
CHLORIDE SERPL-SCNC: 101 MMOL/L — SIGNIFICANT CHANGE UP (ref 98–107)
CO2 SERPL-SCNC: 23 MMOL/L — SIGNIFICANT CHANGE UP (ref 22–31)
CREAT SERPL-MCNC: 0.57 MG/DL — SIGNIFICANT CHANGE UP (ref 0.5–1.3)
EGFR: 106 ML/MIN/1.73M2 — SIGNIFICANT CHANGE UP
EOSINOPHIL # BLD AUTO: 0.21 K/UL — SIGNIFICANT CHANGE UP (ref 0–0.5)
EOSINOPHIL NFR BLD AUTO: 3.2 % — SIGNIFICANT CHANGE UP (ref 0–6)
GLUCOSE BLDC GLUCOMTR-MCNC: 100 MG/DL — HIGH (ref 70–99)
GLUCOSE BLDC GLUCOMTR-MCNC: 106 MG/DL — HIGH (ref 70–99)
GLUCOSE BLDC GLUCOMTR-MCNC: 120 MG/DL — HIGH (ref 70–99)
GLUCOSE BLDC GLUCOMTR-MCNC: 145 MG/DL — HIGH (ref 70–99)
GLUCOSE SERPL-MCNC: 125 MG/DL — HIGH (ref 70–99)
HCT VFR BLD CALC: 27.7 % — LOW (ref 34.5–45)
HCT VFR BLD CALC: 31.6 % — LOW (ref 34.5–45)
HGB BLD-MCNC: 8.6 G/DL — LOW (ref 11.5–15.5)
HGB BLD-MCNC: 9.7 G/DL — LOW (ref 11.5–15.5)
IANC: 3.74 K/UL — SIGNIFICANT CHANGE UP (ref 1.8–7.4)
IMM GRANULOCYTES NFR BLD AUTO: 2.9 % — HIGH (ref 0–0.9)
LYMPHOCYTES # BLD AUTO: 1.88 K/UL — SIGNIFICANT CHANGE UP (ref 1–3.3)
LYMPHOCYTES # BLD AUTO: 29.1 % — SIGNIFICANT CHANGE UP (ref 13–44)
MAGNESIUM SERPL-MCNC: 2.3 MG/DL — SIGNIFICANT CHANGE UP (ref 1.6–2.6)
MCHC RBC-ENTMCNC: 28.1 PG — SIGNIFICANT CHANGE UP (ref 27–34)
MCHC RBC-ENTMCNC: 28.3 PG — SIGNIFICANT CHANGE UP (ref 27–34)
MCHC RBC-ENTMCNC: 30.7 GM/DL — LOW (ref 32–36)
MCHC RBC-ENTMCNC: 31 GM/DL — LOW (ref 32–36)
MCV RBC AUTO: 91.1 FL — SIGNIFICANT CHANGE UP (ref 80–100)
MCV RBC AUTO: 91.6 FL — SIGNIFICANT CHANGE UP (ref 80–100)
MONOCYTES # BLD AUTO: 0.41 K/UL — SIGNIFICANT CHANGE UP (ref 0–0.9)
MONOCYTES NFR BLD AUTO: 6.3 % — SIGNIFICANT CHANGE UP (ref 2–14)
NEUTROPHILS # BLD AUTO: 3.74 K/UL — SIGNIFICANT CHANGE UP (ref 1.8–7.4)
NEUTROPHILS NFR BLD AUTO: 57.9 % — SIGNIFICANT CHANGE UP (ref 43–77)
NRBC # BLD: 0 /100 WBCS — SIGNIFICANT CHANGE UP (ref 0–0)
NRBC # BLD: 0 /100 WBCS — SIGNIFICANT CHANGE UP (ref 0–0)
NRBC # FLD: 0 K/UL — SIGNIFICANT CHANGE UP (ref 0–0)
NRBC # FLD: 0 K/UL — SIGNIFICANT CHANGE UP (ref 0–0)
PHOSPHATE SERPL-MCNC: 4.2 MG/DL — SIGNIFICANT CHANGE UP (ref 2.5–4.5)
PLATELET # BLD AUTO: 365 K/UL — SIGNIFICANT CHANGE UP (ref 150–400)
PLATELET # BLD AUTO: 373 K/UL — SIGNIFICANT CHANGE UP (ref 150–400)
POTASSIUM SERPL-MCNC: 4.4 MMOL/L — SIGNIFICANT CHANGE UP (ref 3.5–5.3)
POTASSIUM SERPL-SCNC: 4.4 MMOL/L — SIGNIFICANT CHANGE UP (ref 3.5–5.3)
PROT SERPL-MCNC: 7.1 G/DL — SIGNIFICANT CHANGE UP (ref 6–8.3)
RBC # BLD: 3.04 M/UL — LOW (ref 3.8–5.2)
RBC # BLD: 3.45 M/UL — LOW (ref 3.8–5.2)
RBC # FLD: 14.5 % — SIGNIFICANT CHANGE UP (ref 10.3–14.5)
RBC # FLD: 14.6 % — HIGH (ref 10.3–14.5)
SODIUM SERPL-SCNC: 136 MMOL/L — SIGNIFICANT CHANGE UP (ref 135–145)
WBC # BLD: 5.67 K/UL — SIGNIFICANT CHANGE UP (ref 3.8–10.5)
WBC # BLD: 6.47 K/UL — SIGNIFICANT CHANGE UP (ref 3.8–10.5)
WBC # FLD AUTO: 5.67 K/UL — SIGNIFICANT CHANGE UP (ref 3.8–10.5)
WBC # FLD AUTO: 6.47 K/UL — SIGNIFICANT CHANGE UP (ref 3.8–10.5)

## 2024-03-22 PROCEDURE — 99232 SBSQ HOSP IP/OBS MODERATE 35: CPT | Mod: GC

## 2024-03-22 RX ORDER — CYCLOBENZAPRINE HYDROCHLORIDE 10 MG/1
5 TABLET, FILM COATED ORAL ONCE
Refills: 0 | Status: COMPLETED | OUTPATIENT
Start: 2024-03-22 | End: 2024-03-22

## 2024-03-22 RX ADMIN — Medication 5000 UNIT(S): at 13:15

## 2024-03-22 RX ADMIN — CYCLOBENZAPRINE HYDROCHLORIDE 5 MILLIGRAM(S): 10 TABLET, FILM COATED ORAL at 03:36

## 2024-03-22 RX ADMIN — METHOCARBAMOL 750 MILLIGRAM(S): 500 TABLET, FILM COATED ORAL at 05:16

## 2024-03-22 RX ADMIN — GABAPENTIN 300 MILLIGRAM(S): 400 CAPSULE ORAL at 13:15

## 2024-03-22 RX ADMIN — POLYETHYLENE GLYCOL 3350 17 GRAM(S): 17 POWDER, FOR SOLUTION ORAL at 13:14

## 2024-03-22 RX ADMIN — Medication 650 MILLIGRAM(S): at 02:57

## 2024-03-22 RX ADMIN — Medication 650 MILLIGRAM(S): at 18:24

## 2024-03-22 RX ADMIN — METHOCARBAMOL 750 MILLIGRAM(S): 500 TABLET, FILM COATED ORAL at 22:43

## 2024-03-22 RX ADMIN — GABAPENTIN 300 MILLIGRAM(S): 400 CAPSULE ORAL at 22:20

## 2024-03-22 RX ADMIN — Medication 650 MILLIGRAM(S): at 03:57

## 2024-03-22 RX ADMIN — TRAMADOL HYDROCHLORIDE 50 MILLIGRAM(S): 50 TABLET ORAL at 05:15

## 2024-03-22 RX ADMIN — METHOCARBAMOL 750 MILLIGRAM(S): 500 TABLET, FILM COATED ORAL at 13:15

## 2024-03-22 RX ADMIN — Medication 650 MILLIGRAM(S): at 13:14

## 2024-03-22 RX ADMIN — Medication 1 ENEMA: at 09:41

## 2024-03-22 RX ADMIN — Medication 650 MILLIGRAM(S): at 05:15

## 2024-03-22 RX ADMIN — TRAMADOL HYDROCHLORIDE 50 MILLIGRAM(S): 50 TABLET ORAL at 18:24

## 2024-03-22 RX ADMIN — GABAPENTIN 300 MILLIGRAM(S): 400 CAPSULE ORAL at 05:15

## 2024-03-22 NOTE — PROGRESS NOTE ADULT - ATTENDING COMMENTS
on repeat labs bili and plt ok wo any intervention - likely lab error   RUQ sono reassuring   counts down to 8.6 but overall trend has been stable - so for now will continue trending counts - per SW, KRUNAL set up wont be ready till after weekend so will monitor counts for now - otherwise stable for dc pending rehab     dw daughter at bedside - decision for AC will out-pt - for now should remain off AC in setting of life-threatening bleed and presence of IVC filter and no e.o new DVT

## 2024-03-22 NOTE — PROGRESS NOTE ADULT - PROBLEM SELECTOR PLAN 6
DVT ppx: holding iso Hematoma  PT consult - recommending Acute Rehab, patient and family still deciding; PM&R cnslted DVT ppx: holding iso Hematoma  PT consult - Acute Rehab

## 2024-03-22 NOTE — PROGRESS NOTE ADULT - SUBJECTIVE AND OBJECTIVE BOX
***************************************************************  Orlin Cordova, PGY1  Internal Medicine   TEAMS Preferred  ***************************************************************    SOPHIE URIOSTEGUI  58y  MRN: [id number]    Patient is a 58y old  Female who presents with a chief complaint of Hematoma       SUBJECTIVE / OVERNIGHT EVENTS:   DILLAN OVN;   Pt seen and examined at bedside. Denies fevers, chills, CP, SOB, Abdominal pain, N/V. Denies any new LLE pain or increased thigh swelling. No new acute complaints.     12 Point ROS negative with the exception of the above    MEDICATIONS  (STANDING):  acetaminophen     Tablet .. 650 milliGRAM(s) Oral every 6 hours  cholecalciferol 5000 Unit(s) Oral daily  dextrose 5%. 1000 milliLiter(s) (50 mL/Hr) IV Continuous <Continuous>  dextrose 5%. 1000 milliLiter(s) (100 mL/Hr) IV Continuous <Continuous>  dextrose 50% Injectable 12.5 Gram(s) IV Push once  dextrose 50% Injectable 25 Gram(s) IV Push once  dextrose 50% Injectable 25 Gram(s) IV Push once  gabapentin 300 milliGRAM(s) Oral three times a day  glucagon  Injectable 1 milliGRAM(s) IntraMuscular once  influenza   Vaccine 0.5 milliLiter(s) IntraMuscular once  insulin lispro (ADMELOG) corrective regimen sliding scale   SubCutaneous three times a day before meals  methocarbamol 750 milliGRAM(s) Oral three times a day  polyethylene glycol 3350 17 Gram(s) Oral daily  senna 2 Tablet(s) Oral at bedtime  traMADol 50 milliGRAM(s) Oral two times a day    MEDICATIONS  (PRN):  dextrose Oral Gel 15 Gram(s) Oral once PRN Blood Glucose LESS THAN 70 milliGRAM(s)/deciliter  saline laxative (FLEET) Rectal Enema 1 Enema Rectal daily PRN constipation, patient request      OBJECTIVE:  Vital Signs Last 24 Hrs  T(C): 36.4 (21 Mar 2024 05:24), Max: 36.5 (20 Mar 2024 12:41)  T(F): 97.5 (21 Mar 2024 05:24), Max: 97.7 (20 Mar 2024 12:41)  HR: 63 (21 Mar 2024 05:24) (62 - 77)  BP: 132/78 (21 Mar 2024 05:24) (109/71 - 132/78)  BP(mean): --  RR: 17 (21 Mar 2024 05:24) (17 - 18)  SpO2: 100% (21 Mar 2024 05:24) (100% - 100%)    Parameters below as of 21 Mar 2024 05:24  Patient On (Oxygen Delivery Method): room air    PHYSICAL EXAM:  GENERAL: Laying comfortably, NAD  HEENT: NCAT, PERRLA, EOMI, no scleral icterus, no LAD  LUNG: CTABL; No wheezes, crackles, or rhonchi  HEART: RRR; normal S1/S2; No murmurs, rubs, or gallops  ABDOMEN: +BS, soft, nontender, nondistended  EXTREMITIES:  +enlarged left thigh but unchanged in appearance compared to prior, no ecchymosis  NEUROLOGY: AOx3, non-focal, strength 5/5 in all extremities, sensation intact  PSYCH: calm and cooperative  SKIN: No rashes or lesions    LABS:                        10.0   x     )-----------( x        ( 21 Mar 2024 06:27 )             29.5     21 Mar 2024 06:27    132    |  99     |  28     ----------------------------<  187    4.5     |  22     |  0.98     Ca    9.4        21 Mar 2024 06:27  Phos  3.4       21 Mar 2024 06:27  Mg     1.60      21 Mar 2024 06:27    TPro  7.8    /  Alb  3.5    /  TBili  1.9    /  DBili  x      /  AST  53     /  ALT  24     /  AlkPhos  200    21 Mar 2024 06:27      Urinalysis Basic - ( 17 Mar 2024 07:12 )    Color: x / Appearance: x / SG: x / pH: x  Gluc: 148 mg/dL / Ketone: x  / Bili: x / Urobili: x   Blood: x / Protein: x / Nitrite: x   Leuk Esterase: x / RBC: x / WBC x   Sq Epi: x / Non Sq Epi: x / Bacteria: x      CAPILLARY BLOOD GLUCOSE      POCT Blood Glucose.: 156 mg/dL (17 Mar 2024 21:41)  POCT Blood Glucose.: 160 mg/dL (17 Mar 2024 17:48)  POCT Blood Glucose.: 141 mg/dL (17 Mar 2024 12:27)  POCT Blood Glucose.: 148 mg/dL (17 Mar 2024 08:33)        RADIOLOGY & ADDITIONAL TESTS:     ***************************************************************  Orlin Cordova, PGY1  Internal Medicine   TEAMS Preferred  ***************************************************************    SOPHIE URIOSTEGUI  58y  MRN: [id number]    Patient is a 58y old  Female who presents with a chief complaint of Hematoma       SUBJECTIVE / OVERNIGHT EVENTS:   DILLAN OVN;   Pt seen and examined at bedside. Denies fevers, chills, CP, SOB, Abdominal pain, N/V. Denies any new LLE pain or increased thigh swelling. No new acute complaints.     12 Point ROS negative with the exception of the above    MEDICATIONS  (STANDING):  acetaminophen     Tablet .. 650 milliGRAM(s) Oral every 6 hours  cholecalciferol 5000 Unit(s) Oral daily  dextrose 5%. 1000 milliLiter(s) (50 mL/Hr) IV Continuous <Continuous>  dextrose 5%. 1000 milliLiter(s) (100 mL/Hr) IV Continuous <Continuous>  dextrose 50% Injectable 12.5 Gram(s) IV Push once  dextrose 50% Injectable 25 Gram(s) IV Push once  dextrose 50% Injectable 25 Gram(s) IV Push once  gabapentin 300 milliGRAM(s) Oral three times a day  glucagon  Injectable 1 milliGRAM(s) IntraMuscular once  influenza   Vaccine 0.5 milliLiter(s) IntraMuscular once  insulin lispro (ADMELOG) corrective regimen sliding scale   SubCutaneous three times a day before meals  methocarbamol 750 milliGRAM(s) Oral three times a day  polyethylene glycol 3350 17 Gram(s) Oral daily  senna 2 Tablet(s) Oral at bedtime  traMADol 50 milliGRAM(s) Oral two times a day    MEDICATIONS  (PRN):  dextrose Oral Gel 15 Gram(s) Oral once PRN Blood Glucose LESS THAN 70 milliGRAM(s)/deciliter  saline laxative (FLEET) Rectal Enema 1 Enema Rectal daily PRN constipation, patient request      OBJECTIVE:  Vital Signs Last 24 Hrs  T(C): 36.9 (22 Mar 2024 05:35), Max: 36.9 (22 Mar 2024 05:35)  T(F): 98.4 (22 Mar 2024 05:35), Max: 98.4 (22 Mar 2024 05:35)  HR: 63 (22 Mar 2024 05:35) (63 - 78)  BP: 105/61 (22 Mar 2024 05:35) (105/61 - 107/64)  BP(mean): --  RR: 18 (22 Mar 2024 05:35) (18 - 18)  SpO2: 99% (22 Mar 2024 05:35) (99% - 99%)    Parameters below as of 22 Mar 2024 05:35  Patient On (Oxygen Delivery Method): room air    LABS:                        8.6    6.47  )-----------( 365      ( 22 Mar 2024 06:37 )             27.7     22 Mar 2024 06:37    136    |  101    |  22     ----------------------------<  125    4.4     |  23     |  0.57     Ca    9.6        22 Mar 2024 06:37  Phos  4.2       22 Mar 2024 06:37  Mg     2.30      22 Mar 2024 06:37    TPro  7.1    /  Alb  3.2    /  TBili  0.8    /  DBili  x      /  AST  15     /  ALT  14     /  AlkPhos  65     22 Mar 2024 06:37      PHYSICAL EXAM:  GENERAL: Laying comfortably, NAD  HEENT: NCAT, PERRLA, EOMI, no scleral icterus, no LAD  LUNG: CTABL; No wheezes, crackles, or rhonchi  HEART: RRR; normal S1/S2; No murmurs, rubs, or gallops  ABDOMEN: +BS, soft, nontender, nondistended  EXTREMITIES:  +enlarged left thigh but unchanged in appearance compared to prior, no ecchymosis  NEUROLOGY: AOx3, non-focal, strength 5/5 in all extremities, sensation intact  PSYCH: calm and cooperative  SKIN: No rashes or lesions        Urinalysis Basic - ( 17 Mar 2024 07:12 )    Color: x / Appearance: x / SG: x / pH: x  Gluc: 148 mg/dL / Ketone: x  / Bili: x / Urobili: x   Blood: x / Protein: x / Nitrite: x   Leuk Esterase: x / RBC: x / WBC x   Sq Epi: x / Non Sq Epi: x / Bacteria: x      CAPILLARY BLOOD GLUCOSE      POCT Blood Glucose.: 156 mg/dL (17 Mar 2024 21:41)  POCT Blood Glucose.: 160 mg/dL (17 Mar 2024 17:48)  POCT Blood Glucose.: 141 mg/dL (17 Mar 2024 12:27)  POCT Blood Glucose.: 148 mg/dL (17 Mar 2024 08:33)        RADIOLOGY & ADDITIONAL TESTS:

## 2024-03-23 LAB
ALBUMIN SERPL ELPH-MCNC: 2.8 G/DL — LOW (ref 3.3–5)
ALP SERPL-CCNC: 69 U/L — SIGNIFICANT CHANGE UP (ref 40–120)
ALT FLD-CCNC: 18 U/L — SIGNIFICANT CHANGE UP (ref 4–33)
ANION GAP SERPL CALC-SCNC: 14 MMOL/L — SIGNIFICANT CHANGE UP (ref 7–14)
AST SERPL-CCNC: 27 U/L — SIGNIFICANT CHANGE UP (ref 4–32)
BILIRUB SERPL-MCNC: 0.8 MG/DL — SIGNIFICANT CHANGE UP (ref 0.2–1.2)
BUN SERPL-MCNC: 23 MG/DL — SIGNIFICANT CHANGE UP (ref 7–23)
CALCIUM SERPL-MCNC: 9.6 MG/DL — SIGNIFICANT CHANGE UP (ref 8.4–10.5)
CHLORIDE SERPL-SCNC: 104 MMOL/L — SIGNIFICANT CHANGE UP (ref 98–107)
CO2 SERPL-SCNC: 18 MMOL/L — LOW (ref 22–31)
CREAT SERPL-MCNC: 0.47 MG/DL — LOW (ref 0.5–1.3)
EGFR: 111 ML/MIN/1.73M2 — SIGNIFICANT CHANGE UP
GLUCOSE BLDC GLUCOMTR-MCNC: 109 MG/DL — HIGH (ref 70–99)
GLUCOSE BLDC GLUCOMTR-MCNC: 112 MG/DL — HIGH (ref 70–99)
GLUCOSE BLDC GLUCOMTR-MCNC: 117 MG/DL — HIGH (ref 70–99)
GLUCOSE BLDC GLUCOMTR-MCNC: 88 MG/DL — SIGNIFICANT CHANGE UP (ref 70–99)
GLUCOSE SERPL-MCNC: 93 MG/DL — SIGNIFICANT CHANGE UP (ref 70–99)
MAGNESIUM SERPL-MCNC: 2.1 MG/DL — SIGNIFICANT CHANGE UP (ref 1.6–2.6)
PHOSPHATE SERPL-MCNC: 4.6 MG/DL — HIGH (ref 2.5–4.5)
POTASSIUM SERPL-MCNC: 5.2 MMOL/L — SIGNIFICANT CHANGE UP (ref 3.5–5.3)
POTASSIUM SERPL-SCNC: 5.2 MMOL/L — SIGNIFICANT CHANGE UP (ref 3.5–5.3)
PROT SERPL-MCNC: 7.4 G/DL — SIGNIFICANT CHANGE UP (ref 6–8.3)
SODIUM SERPL-SCNC: 136 MMOL/L — SIGNIFICANT CHANGE UP (ref 135–145)

## 2024-03-23 PROCEDURE — 99233 SBSQ HOSP IP/OBS HIGH 50: CPT

## 2024-03-23 RX ADMIN — GABAPENTIN 300 MILLIGRAM(S): 400 CAPSULE ORAL at 13:54

## 2024-03-23 RX ADMIN — SENNA PLUS 2 TABLET(S): 8.6 TABLET ORAL at 21:31

## 2024-03-23 RX ADMIN — GABAPENTIN 300 MILLIGRAM(S): 400 CAPSULE ORAL at 21:31

## 2024-03-23 RX ADMIN — METHOCARBAMOL 750 MILLIGRAM(S): 500 TABLET, FILM COATED ORAL at 13:55

## 2024-03-23 RX ADMIN — TRAMADOL HYDROCHLORIDE 50 MILLIGRAM(S): 50 TABLET ORAL at 06:35

## 2024-03-23 RX ADMIN — Medication 1 ENEMA: at 11:57

## 2024-03-23 RX ADMIN — METHOCARBAMOL 750 MILLIGRAM(S): 500 TABLET, FILM COATED ORAL at 21:31

## 2024-03-23 RX ADMIN — POLYETHYLENE GLYCOL 3350 17 GRAM(S): 17 POWDER, FOR SOLUTION ORAL at 13:55

## 2024-03-23 RX ADMIN — GABAPENTIN 300 MILLIGRAM(S): 400 CAPSULE ORAL at 06:34

## 2024-03-23 RX ADMIN — Medication 650 MILLIGRAM(S): at 06:35

## 2024-03-23 RX ADMIN — Medication 650 MILLIGRAM(S): at 01:01

## 2024-03-23 RX ADMIN — Medication 5000 UNIT(S): at 13:56

## 2024-03-23 RX ADMIN — TRAMADOL HYDROCHLORIDE 50 MILLIGRAM(S): 50 TABLET ORAL at 18:53
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
## 2024-03-23 NOTE — PHYSICAL THERAPY INITIAL EVALUATION ADULT - SITTING BALANCE: DYNAMIC
ESTABLISH CARE NOTE      HISTORY:    The patient is a 39 year old female who comes in to establish care in my practice.  Previous care was provided by [facility].  Pauline has the following medical issues:    Seronegative rheumatoid arthritis (CMS/[facility])  On etanercept    Anxiety  Well controlled on regimen.      MEDICATIONS:  Outpatient Medications Marked as Taking for the 7/23/21 encounter (Office Visit) with Jose Schneider MD   Medication Sig Dispense Refill   • levothyroxine 25 MCG tablet LEVOTHYROXINE SODIUM 25 MCG TABS     • predniSONE (DELTASONE) 5 MG tablet      • propranolol (INDERAL) 40 MG tablet Take 40 mg by mouth.     • zolpidem (Ambien) 5 MG tablet AMBIEN 5 MG TABS     • Etanercept 50 MG/ML Solution Cartridge 50 mg injection every month by rheumatology         ALLERGIES:  Allergies as of 07/23/2021   • (No Known Allergies)       HISTORIES:  There is no problem list on file for this patient.      History reviewed. No pertinent surgical history.    History reviewed. No pertinent family history.    Social History     Occupational History   • Not on file   Tobacco Use   • Smoking status: Never Smoker   • Smokeless tobacco: Never Used   Substance and Sexual Activity   • Alcohol use: Not on file   • Drug use: Not on file   • Sexual activity: Not on file       Health Maintenance Due   Topic Date Due   • Cervical Cancer Screen 30-64 -  Never done   • Pneumococcal Vaccine 0-64 (2 of 4 - PCV13) 01/21/2020       Patient is due for PAP and pelvic exam, declines pneumonia vaccine.    REVIEW OF SYSTEMS:     Constitutional:  Denies excessive fatigue, unexplained weight loss or gain, excessive sweating or night sweats.  Eyes:  Denies vision change, eye pain.   HENT:  Denies hearing loss, tinnitus, nasal obstruction or discharge, hoarseness, painful swallowing.  Respiratory:  Denies chronic cough, wheezing, shortness of breath.  Cardiovascular:  Denies chest pain, heart murmur, palpitations, claudication symptoms, ankle  swelling.  Gastrointestinal:  Denies poor appetite, heartburn, black or bloody stools, recurrent abdominal pain, frequent nausea or vomiting, persistent constipation, persistent diarrhea.  Genitourinary:  Denies dysuria, nocturia, hematuria, urinary incontinence.  Musculoskeletal:  Achy fingers, toes, elbows  Integument:  Denies recurrent rash, changes in moles, abnormal hair growth.  Neurologic:  Denies headache, unexplained dizziness, loss of consciousness, head injury, focal weakness, sensory changes, tremors, difficulty talking, loss of balance, falls.  Lymphatic:  Denies swollen lymph nodes, easy bruising or bleeding, history of blood clots.  Psychiatric:  Denies insomnia, anxiety, irritability or mood swings, depression, suicidal thoughts, difficulty with concentration or memory.  Reproductive:  Denies history of sexually transmitted infection.    PHYSICAL EXAM  Vitals:  Blood pressure 112/86, pulse 78, weight 67.9 kg..  Constitutional:  Well developed, well nourished, no acute distress, non-toxic appearance.   Eyes:  Pupils equal, conjunctivae normal, no icterus.  HENT:  Atraumatic, external ears normal, nose normal, oropharynx moist, no pharyngeal exudates.  Neck- Normal range of motion, no tenderness, supple.  No carotid bruits.  Respiratory:  No respiratory distress, normal breath sounds, no rales, no wheezing.   Cardiovascular:  Normal rate, normal rhythm, no murmurs, no gallops, no rubs.  Gastrointestinal:  Soft, nondistended, normal bowel sounds, nontender, no hepatosplenomegaly, no mass, no rebound, no guarding.  Genitourinary:  No costovertebral angle tenderness.   Musculoskeletal:  No edema, no tenderness, no deformities.  Back- No tenderness.  Integument:  Well hydrated, no rash.  No malignant-appearing lesions noted.   Lymphatic:  No lymphadenopathy noted.  Neurologic:  Alert and oriented to place, person, time; normal motor function, no focal motor or sensory deficits noted, reflexes symmetric.   No tremor.  Psychiatric:  Speech and behavior appropriate.      ASSESSMENT/PLAN:  Generally healthy 39 year old female  Good control of modifiable risk factors    Seronegative rheumatoid arthritis (CMS/HCC)  Good control    Anxiety  Well controlled on current regimen    Acquired hypothyroidism  Tsh 2.8; continue low dose supplement      Recent PHQ 2/9 Score    PHQ 2:  Date Adult PHQ 2 Score Adult PHQ 2 Interpretation   7/23/2021 0 No further screening needed       PHQ 9:       DEPRESSION ASSESSMENT/PLAN:  Depression screening is negative no further plan needed.       There is no height or weight on file to calculate BMI.    BMI ASSESSMENT/PLAN:  Patient BMI is within normal range.       Return in about 1 year (around 7/23/2022) for physical.           poor balance

## 2024-03-23 NOTE — PHYSICAL THERAPY INITIAL EVALUATION ADULT - NSPTDISCHREC_GEN_A_CORE
Pt would benefit from Inpatient rehabilitative services in order to improve functional limitations associated with C5-C6 SCI
Inpatient rehabilitative services
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
## 2024-03-23 NOTE — PHYSICAL THERAPY INITIAL EVALUATION ADULT - LEVEL OF INDEPENDENCE: SIT/SUPINE, REHAB EVAL
All bed movement performed with [place] Cervical Collar/maximum assist (25% patients effort)

## 2024-03-23 NOTE — PHYSICAL THERAPY INITIAL EVALUATION ADULT - PLANNED THERAPY INTERVENTIONS, PT EVAL
bed mobility training/ROM/strengthening/stretching
balance training/bed mobility training/ROM/strengthening

## 2024-03-23 NOTE — PHYSICAL THERAPY INITIAL EVALUATION ADULT - MANUAL MUSCLE TESTING RESULTS, REHAB EVAL
B/l UE grossly 2/5.  Unable to engage in b/l elbow extension.  Unable to engage intrinsic hand muscles
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
## 2024-03-23 NOTE — PHYSICAL THERAPY INITIAL EVALUATION ADULT - RANGE OF MOTION EXAMINATION, REHAB EVAL
ROM limitations noted with b/l shoulder flexion, b/l elbow extension consistent with c5-c6 SCI
ROM limitations noted with b/l shoulder flexion, b/l elbow extension consistent with c5-c6 SCI/bilateral upper extremity ROM was WFL (within functional limits)/bilateral lower extremity ROM was WFL (within functional limits)

## 2024-03-23 NOTE — PHYSICAL THERAPY INITIAL EVALUATION ADULT - LEVEL OF INDEPENDENCE: SIT/STAND, REHAB EVAL
Patient engaged in Passive Range of Motion of the Lower extremities.  Pt performed passive Hip flexion, external rotation, and knee flexion without complaints./unable to perform

## 2024-03-23 NOTE — PROGRESS NOTE ADULT - SUBJECTIVE AND OBJECTIVE BOX
***************************************************************  Orlin Cordova, PGY1  Internal Medicine   TEAMS Preferred  ***************************************************************    SOPHIE URIOSTEGUI  58y  MRN: [id number]    Patient is a 58y old  Female who presents with a chief complaint of Hematoma       SUBJECTIVE / OVERNIGHT EVENTS:   DILLAN OVN;   Pt seen and examined at bedside. Denies fevers, chills, CP, SOB, Abdominal pain, N/V. Denies any new LLE pain or increased thigh swelling. No new acute complaints.     12 Point ROS negative with the exception of the above    MEDICATIONS  (STANDING):  acetaminophen     Tablet .. 650 milliGRAM(s) Oral every 6 hours  cholecalciferol 5000 Unit(s) Oral daily  dextrose 5%. 1000 milliLiter(s) (50 mL/Hr) IV Continuous <Continuous>  dextrose 5%. 1000 milliLiter(s) (100 mL/Hr) IV Continuous <Continuous>  dextrose 50% Injectable 12.5 Gram(s) IV Push once  dextrose 50% Injectable 25 Gram(s) IV Push once  dextrose 50% Injectable 25 Gram(s) IV Push once  gabapentin 300 milliGRAM(s) Oral three times a day  glucagon  Injectable 1 milliGRAM(s) IntraMuscular once  influenza   Vaccine 0.5 milliLiter(s) IntraMuscular once  insulin lispro (ADMELOG) corrective regimen sliding scale   SubCutaneous three times a day before meals  methocarbamol 750 milliGRAM(s) Oral three times a day  polyethylene glycol 3350 17 Gram(s) Oral daily  senna 2 Tablet(s) Oral at bedtime  traMADol 50 milliGRAM(s) Oral two times a day    MEDICATIONS  (PRN):  dextrose Oral Gel 15 Gram(s) Oral once PRN Blood Glucose LESS THAN 70 milliGRAM(s)/deciliter  saline laxative (FLEET) Rectal Enema 1 Enema Rectal daily PRN constipation, patient request      OBJECTIVE:  Vital Signs Last 24 Hrs  T(C): 36.5 (23 Mar 2024 05:42), Max: 36.9 (22 Mar 2024 21:38)  T(F): 97.7 (23 Mar 2024 05:42), Max: 98.4 (22 Mar 2024 21:38)  HR: 61 (23 Mar 2024 05:42) (61 - 77)  BP: 108/74 (23 Mar 2024 05:42) (102/70 - 108/74)  BP(mean): --  RR: 18 (23 Mar 2024 05:42) (17 - 18)  SpO2: 100% (23 Mar 2024 05:42) (100% - 100%)    Parameters below as of 23 Mar 2024 05:42  Patient On (Oxygen Delivery Method): room air      PHYSICAL EXAM:  GENERAL: Laying comfortably, NAD  HEENT: NCAT, PERRLA, EOMI, no scleral icterus, no LAD  LUNG: CTABL; No wheezes, crackles, or rhonchi  HEART: RRR; normal S1/S2; No murmurs, rubs, or gallops  ABDOMEN: +BS, soft, nontender, nondistended  EXTREMITIES:  +enlarged left thigh but unchanged in appearance compared to prior, no ecchymosis  NEUROLOGY: AOx3, non-focal, strength 5/5 in all extremities, sensation intact  PSYCH: calm and cooperative  SKIN: No rashes or lesions    LABS:                        9.7    5.67  )-----------( 373      ( 22 Mar 2024 11:47 )             31.6       Ca    9.6        22 Mar 2024 06:37          Urinalysis Basic - ( 17 Mar 2024 07:12 )    Color: x / Appearance: x / SG: x / pH: x  Gluc: 148 mg/dL / Ketone: x  / Bili: x / Urobili: x   Blood: x / Protein: x / Nitrite: x   Leuk Esterase: x / RBC: x / WBC x   Sq Epi: x / Non Sq Epi: x / Bacteria: x      CAPILLARY BLOOD GLUCOSE      POCT Blood Glucose.: 156 mg/dL (17 Mar 2024 21:41)  POCT Blood Glucose.: 160 mg/dL (17 Mar 2024 17:48)  POCT Blood Glucose.: 141 mg/dL (17 Mar 2024 12:27)  POCT Blood Glucose.: 148 mg/dL (17 Mar 2024 08:33)        RADIOLOGY & ADDITIONAL TESTS:     ***************************************************************  Orlin Cordova, PGY1  Internal Medicine   TEAMS Preferred  ***************************************************************    SOPHIE URIOSTEGUI  58y  MRN: [id number]    Patient is a 58y old  Female who presents with a chief complaint of Hematoma       SUBJECTIVE / OVERNIGHT EVENTS:   DILLAN OVN;   Pt seen and examined at bedside. Denies fevers, chills, CP, SOB, Abdominal pain, N/V. Denies any new LLE pain or increased thigh swelling. No new acute complaints.     12 Point ROS negative with the exception of the above    MEDICATIONS  (STANDING):  acetaminophen     Tablet .. 650 milliGRAM(s) Oral every 6 hours  cholecalciferol 5000 Unit(s) Oral daily  dextrose 5%. 1000 milliLiter(s) (50 mL/Hr) IV Continuous <Continuous>  dextrose 5%. 1000 milliLiter(s) (100 mL/Hr) IV Continuous <Continuous>  dextrose 50% Injectable 12.5 Gram(s) IV Push once  dextrose 50% Injectable 25 Gram(s) IV Push once  dextrose 50% Injectable 25 Gram(s) IV Push once  gabapentin 300 milliGRAM(s) Oral three times a day  glucagon  Injectable 1 milliGRAM(s) IntraMuscular once  influenza   Vaccine 0.5 milliLiter(s) IntraMuscular once  insulin lispro (ADMELOG) corrective regimen sliding scale   SubCutaneous three times a day before meals  methocarbamol 750 milliGRAM(s) Oral three times a day  polyethylene glycol 3350 17 Gram(s) Oral daily  senna 2 Tablet(s) Oral at bedtime  traMADol 50 milliGRAM(s) Oral two times a day    MEDICATIONS  (PRN):  dextrose Oral Gel 15 Gram(s) Oral once PRN Blood Glucose LESS THAN 70 milliGRAM(s)/deciliter  saline laxative (FLEET) Rectal Enema 1 Enema Rectal daily PRN constipation, patient request      OBJECTIVE:  Vital Signs Last 24 Hrs  T(C): 36.5 (23 Mar 2024 05:42), Max: 36.9 (22 Mar 2024 21:38)  T(F): 97.7 (23 Mar 2024 05:42), Max: 98.4 (22 Mar 2024 21:38)  HR: 61 (23 Mar 2024 05:42) (61 - 77)  BP: 108/74 (23 Mar 2024 05:42) (102/70 - 108/74)  BP(mean): --  RR: 18 (23 Mar 2024 05:42) (17 - 18)  SpO2: 100% (23 Mar 2024 05:42) (100% - 100%)    Parameters below as of 23 Mar 2024 05:42  Patient On (Oxygen Delivery Method): room air    PHYSICAL EXAM:  GENERAL: Laying comfortably, NAD  HEENT: NCAT, PERRLA, EOMI, no scleral icterus, no LAD  LUNG: CTABL; No wheezes, crackles, or rhonchi  HEART: RRR; normal S1/S2; No murmurs, rubs, or gallops  ABDOMEN: +BS, soft, nontender, nondistended  EXTREMITIES:  +enlarged left thigh but unchanged in appearance compared to prior, no ecchymosis  NEUROLOGY: AOx3, non-focal, strength 5/5 in all extremities, sensation intact  PSYCH: calm and cooperative  SKIN: No rashes or lesions    LABS:    23 Mar 2024 10:05    136    |  104    |  23     ----------------------------<  93     5.2     |  18     |  0.47     Ca    9.6        23 Mar 2024 10:05  Phos  4.6       23 Mar 2024 10:05  Mg     2.10      23 Mar 2024 10:05    TPro  7.4    /  Alb  2.8    /  TBili  0.8    /  DBili  x      /  AST  27     /  ALT  18     /  AlkPhos  69     23 Mar 2024 10:05      Urinalysis Basic - ( 17 Mar 2024 07:12 )    Color: x / Appearance: x / SG: x / pH: x  Gluc: 148 mg/dL / Ketone: x  / Bili: x / Urobili: x   Blood: x / Protein: x / Nitrite: x   Leuk Esterase: x / RBC: x / WBC x   Sq Epi: x / Non Sq Epi: x / Bacteria: x      CAPILLARY BLOOD GLUCOSE      POCT Blood Glucose.: 156 mg/dL (17 Mar 2024 21:41)  POCT Blood Glucose.: 160 mg/dL (17 Mar 2024 17:48)  POCT Blood Glucose.: 141 mg/dL (17 Mar 2024 12:27)  POCT Blood Glucose.: 148 mg/dL (17 Mar 2024 08:33)        RADIOLOGY & ADDITIONAL TESTS:

## 2024-03-24 LAB
GLUCOSE BLDC GLUCOMTR-MCNC: 104 MG/DL — HIGH (ref 70–99)
GLUCOSE BLDC GLUCOMTR-MCNC: 126 MG/DL — HIGH (ref 70–99)
GLUCOSE BLDC GLUCOMTR-MCNC: 127 MG/DL — HIGH (ref 70–99)
GLUCOSE BLDC GLUCOMTR-MCNC: 78 MG/DL — SIGNIFICANT CHANGE UP (ref 70–99)

## 2024-03-24 PROCEDURE — 99232 SBSQ HOSP IP/OBS MODERATE 35: CPT

## 2024-03-24 RX ORDER — TRAMADOL HYDROCHLORIDE 50 MG/1
50 TABLET ORAL
Refills: 0 | Status: DISCONTINUED | OUTPATIENT
Start: 2024-03-24 | End: 2024-03-25

## 2024-03-24 RX ADMIN — GABAPENTIN 300 MILLIGRAM(S): 400 CAPSULE ORAL at 05:25

## 2024-03-24 RX ADMIN — GABAPENTIN 300 MILLIGRAM(S): 400 CAPSULE ORAL at 22:05

## 2024-03-24 RX ADMIN — Medication 650 MILLIGRAM(S): at 00:40

## 2024-03-24 RX ADMIN — METHOCARBAMOL 750 MILLIGRAM(S): 500 TABLET, FILM COATED ORAL at 13:53

## 2024-03-24 RX ADMIN — TRAMADOL HYDROCHLORIDE 50 MILLIGRAM(S): 50 TABLET ORAL at 18:53

## 2024-03-24 RX ADMIN — GABAPENTIN 300 MILLIGRAM(S): 400 CAPSULE ORAL at 13:53

## 2024-03-24 RX ADMIN — Medication 5000 UNIT(S): at 11:59

## 2024-03-24 RX ADMIN — METHOCARBAMOL 750 MILLIGRAM(S): 500 TABLET, FILM COATED ORAL at 22:05

## 2024-03-24 RX ADMIN — TRAMADOL HYDROCHLORIDE 50 MILLIGRAM(S): 50 TABLET ORAL at 17:48

## 2024-03-24 RX ADMIN — Medication 650 MILLIGRAM(S): at 05:25

## 2024-03-24 RX ADMIN — POLYETHYLENE GLYCOL 3350 17 GRAM(S): 17 POWDER, FOR SOLUTION ORAL at 11:59

## 2024-03-24 RX ADMIN — METHOCARBAMOL 750 MILLIGRAM(S): 500 TABLET, FILM COATED ORAL at 05:26

## 2024-03-24 NOTE — PROGRESS NOTE ADULT - SUBJECTIVE AND OBJECTIVE BOX
***************************************************************  Orlin Cordova, PGY1  Internal Medicine   TEAMS Preferred  ***************************************************************    SOPHIE URIOSTEGUI  58y  MRN: [id number]    Patient is a 58y old  Female who presents with a chief complaint of Hematoma       SUBJECTIVE / OVERNIGHT EVENTS:   DILLAN OVN;   Pt seen and examined at bedside. Denies fevers, chills, CP, SOB, Abdominal pain, N/V. Denies any new LLE pain or increased thigh swelling. No new acute complaints.     12 Point ROS negative with the exception of the above    MEDICATIONS  (STANDING):  acetaminophen     Tablet .. 650 milliGRAM(s) Oral every 6 hours  cholecalciferol 5000 Unit(s) Oral daily  dextrose 5%. 1000 milliLiter(s) (50 mL/Hr) IV Continuous <Continuous>  dextrose 5%. 1000 milliLiter(s) (100 mL/Hr) IV Continuous <Continuous>  dextrose 50% Injectable 12.5 Gram(s) IV Push once  dextrose 50% Injectable 25 Gram(s) IV Push once  dextrose 50% Injectable 25 Gram(s) IV Push once  gabapentin 300 milliGRAM(s) Oral three times a day  glucagon  Injectable 1 milliGRAM(s) IntraMuscular once  influenza   Vaccine 0.5 milliLiter(s) IntraMuscular once  insulin lispro (ADMELOG) corrective regimen sliding scale   SubCutaneous three times a day before meals  methocarbamol 750 milliGRAM(s) Oral three times a day  polyethylene glycol 3350 17 Gram(s) Oral daily  senna 2 Tablet(s) Oral at bedtime  traMADol 50 milliGRAM(s) Oral two times a day    MEDICATIONS  (PRN):  dextrose Oral Gel 15 Gram(s) Oral once PRN Blood Glucose LESS THAN 70 milliGRAM(s)/deciliter  saline laxative (FLEET) Rectal Enema 1 Enema Rectal daily PRN constipation, patient request      OBJECTIVE:  Vital Signs Last 24 Hrs  T(C): 36.8 (24 Mar 2024 05:09), Max: 36.9 (23 Mar 2024 15:06)  T(F): 98.3 (24 Mar 2024 05:09), Max: 98.4 (23 Mar 2024 15:06)  HR: 67 (24 Mar 2024 05:09) (64 - 67)  BP: 112/70 (24 Mar 2024 05:09) (106/82 - 141/75)  BP(mean): --  RR: 17 (24 Mar 2024 05:09) (17 - 18)  SpO2: 98% (24 Mar 2024 05:09) (98% - 100%)    Parameters below as of 24 Mar 2024 05:09  Patient On (Oxygen Delivery Method): room air    PHYSICAL EXAM:  GENERAL: Laying comfortably, NAD  HEENT: NCAT, PERRLA, EOMI, no scleral icterus, no LAD  LUNG: CTABL; No wheezes, crackles, or rhonchi  HEART: RRR; normal S1/S2; No murmurs, rubs, or gallops  ABDOMEN: +BS, soft, nontender, nondistended  EXTREMITIES:  +enlarged left thigh but unchanged in appearance compared to prior, no ecchymosis  NEUROLOGY: AOx3, non-focal, strength 5/5 in all extremities, sensation intact  PSYCH: calm and cooperative  SKIN: No rashes or lesions    LABS:      Ca    9.6        23 Mar 2024 10:05    Urinalysis Basic - ( 17 Mar 2024 07:12 )    Color: x / Appearance: x / SG: x / pH: x  Gluc: 148 mg/dL / Ketone: x  / Bili: x / Urobili: x   Blood: x / Protein: x / Nitrite: x   Leuk Esterase: x / RBC: x / WBC x   Sq Epi: x / Non Sq Epi: x / Bacteria: x      CAPILLARY BLOOD GLUCOSE      POCT Blood Glucose.: 156 mg/dL (17 Mar 2024 21:41)  POCT Blood Glucose.: 160 mg/dL (17 Mar 2024 17:48)  POCT Blood Glucose.: 141 mg/dL (17 Mar 2024 12:27)  POCT Blood Glucose.: 148 mg/dL (17 Mar 2024 08:33)        RADIOLOGY & ADDITIONAL TESTS:

## 2024-03-25 ENCOUNTER — TRANSCRIPTION ENCOUNTER (OUTPATIENT)
Age: 59
End: 2024-03-25

## 2024-03-25 VITALS
HEART RATE: 75 BPM | SYSTOLIC BLOOD PRESSURE: 100 MMHG | OXYGEN SATURATION: 100 % | TEMPERATURE: 98 F | RESPIRATION RATE: 17 BRPM | DIASTOLIC BLOOD PRESSURE: 60 MMHG

## 2024-03-25 LAB
BLD GP AB SCN SERPL QL: NEGATIVE — SIGNIFICANT CHANGE UP
GLUCOSE BLDC GLUCOMTR-MCNC: 115 MG/DL — HIGH (ref 70–99)
GLUCOSE BLDC GLUCOMTR-MCNC: 123 MG/DL — HIGH (ref 70–99)
HCT VFR BLD CALC: 28.7 % — LOW (ref 34.5–45)
HGB BLD-MCNC: 9.2 G/DL — LOW (ref 11.5–15.5)
MCHC RBC-ENTMCNC: 28.9 PG — SIGNIFICANT CHANGE UP (ref 27–34)
MCHC RBC-ENTMCNC: 32.1 GM/DL — SIGNIFICANT CHANGE UP (ref 32–36)
MCV RBC AUTO: 90.3 FL — SIGNIFICANT CHANGE UP (ref 80–100)
NRBC # BLD: 0 /100 WBCS — SIGNIFICANT CHANGE UP (ref 0–0)
NRBC # FLD: 0 K/UL — SIGNIFICANT CHANGE UP (ref 0–0)
PLATELET # BLD AUTO: 400 K/UL — SIGNIFICANT CHANGE UP (ref 150–400)
RBC # BLD: 3.18 M/UL — LOW (ref 3.8–5.2)
RBC # FLD: 14 % — SIGNIFICANT CHANGE UP (ref 10.3–14.5)
RH IG SCN BLD-IMP: POSITIVE — SIGNIFICANT CHANGE UP
WBC # BLD: 5.82 K/UL — SIGNIFICANT CHANGE UP (ref 3.8–10.5)
WBC # FLD AUTO: 5.82 K/UL — SIGNIFICANT CHANGE UP (ref 3.8–10.5)

## 2024-03-25 PROCEDURE — 99232 SBSQ HOSP IP/OBS MODERATE 35: CPT

## 2024-03-25 PROCEDURE — 99239 HOSP IP/OBS DSCHRG MGMT >30: CPT

## 2024-03-25 RX ORDER — TRAMADOL HYDROCHLORIDE 50 MG/1
1 TABLET ORAL
Refills: 0 | DISCHARGE

## 2024-03-25 RX ORDER — TRAMADOL HYDROCHLORIDE 50 MG/1
1 TABLET ORAL
Qty: 6 | Refills: 0
Start: 2024-03-25 | End: 2024-03-27

## 2024-03-25 RX ORDER — ISOPROPYL ALCOHOL, BENZOCAINE .7; .06 ML/ML; ML/ML
0 SWAB TOPICAL
Qty: 100 | Refills: 1
Start: 2024-03-25

## 2024-03-25 RX ADMIN — GABAPENTIN 300 MILLIGRAM(S): 400 CAPSULE ORAL at 05:30

## 2024-03-25 RX ADMIN — GABAPENTIN 300 MILLIGRAM(S): 400 CAPSULE ORAL at 13:55

## 2024-03-25 RX ADMIN — Medication 650 MILLIGRAM(S): at 00:02

## 2024-03-25 RX ADMIN — TRAMADOL HYDROCHLORIDE 50 MILLIGRAM(S): 50 TABLET ORAL at 05:38

## 2024-03-25 RX ADMIN — Medication 650 MILLIGRAM(S): at 05:56

## 2024-03-25 RX ADMIN — Medication 5000 UNIT(S): at 11:44

## 2024-03-25 RX ADMIN — METHOCARBAMOL 750 MILLIGRAM(S): 500 TABLET, FILM COATED ORAL at 13:55

## 2024-03-25 RX ADMIN — METHOCARBAMOL 750 MILLIGRAM(S): 500 TABLET, FILM COATED ORAL at 05:56

## 2024-03-25 RX ADMIN — Medication 650 MILLIGRAM(S): at 11:44

## 2024-03-25 NOTE — PROGRESS NOTE ADULT - PROBLEM SELECTOR PROBLEM 2
Anemia due to chronic blood loss
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
## 2024-03-25 NOTE — PROGRESS NOTE ADULT - PROBLEM SELECTOR PLAN 2
Hgb 3 on adm 2/2 hematoma  - s/p 5u pRBCs   - q4 CBC Hgb 3 on adm 2/2 hematoma    Plan  - s/p 5u pRBCs

## 2024-03-25 NOTE — PROGRESS NOTE ADULT - PROBLEM SELECTOR PLAN 1
LLE Hematoma  - CTA LLE without active bleeding  -  light compression  - Monitor CBC Q6-8hrs  - hold therapeutic coagulation i/s/o of anemia and IVC filter in place  - low concern for compartment syndrome; patient remains HD stable  -standing tylenol for pain  - tramadol 50 bid  - s/p 5u pRBCs thus far  - If Hgb continues to intermittently decrease, will need to repeat CTA LLE LLE Hematoma  - CTA LLE without active bleeding  -  light compression  - Monitor CBC Q6-8hrs  - hold therapeutic coagulation i/s/o of anemia and IVC filter in place  - low concern for compartment syndrome; patient remains HD stable    Plan  -standing tylenol for pain  - tramadol 50 bid  - s/p 5u pRBCs thus far  - If Hgb continues to intermittently decrease, will need to repeat CTA LLE

## 2024-03-25 NOTE — PROGRESS NOTE ADULT - SUBJECTIVE AND OBJECTIVE BOX
PROGRESS NOTE:   Authored by Dr. Fady Deras MD (PGY-1)  Patient is a 58y old  Female who presents with a chief complaint of Hematoma (24 Mar 2024 11:01)    SUBJECTIVE / OVERNIGHT EVENTS:  No acute events overnight.     MEDICATIONS  (STANDING):  acetaminophen     Tablet .. 650 milliGRAM(s) Oral every 6 hours  cholecalciferol 5000 Unit(s) Oral daily  dextrose 5%. 1000 milliLiter(s) (100 mL/Hr) IV Continuous <Continuous>  dextrose 5%. 1000 milliLiter(s) (50 mL/Hr) IV Continuous <Continuous>  dextrose 50% Injectable 25 Gram(s) IV Push once  dextrose 50% Injectable 12.5 Gram(s) IV Push once  dextrose 50% Injectable 25 Gram(s) IV Push once  gabapentin 300 milliGRAM(s) Oral three times a day  glucagon  Injectable 1 milliGRAM(s) IntraMuscular once  influenza   Vaccine 0.5 milliLiter(s) IntraMuscular once  insulin lispro (ADMELOG) corrective regimen sliding scale   SubCutaneous three times a day before meals  methocarbamol 750 milliGRAM(s) Oral three times a day  polyethylene glycol 3350 17 Gram(s) Oral daily  senna 2 Tablet(s) Oral at bedtime  traMADol 50 milliGRAM(s) Oral two times a day    MEDICATIONS  (PRN):  dextrose Oral Gel 15 Gram(s) Oral once PRN Blood Glucose LESS THAN 70 milliGRAM(s)/deciliter  saline laxative (FLEET) Rectal Enema 1 Enema Rectal daily PRN constipation, patient request   CAPILLARY BLOOD GLUCOSE    POCT Blood Glucose.: 127 mg/dL (24 Mar 2024 21:55)  POCT Blood Glucose.: 78 mg/dL (24 Mar 2024 17:56)  POCT Blood Glucose.: 104 mg/dL (24 Mar 2024 12:15)  POCT Blood Glucose.: 126 mg/dL (24 Mar 2024 08:30)   I&O's Summary    24 Mar 2024 07:01  -  25 Mar 2024 07:00  --------------------------------------------------------  IN: 480 mL / OUT: 300 mL / NET: 180 mL     PHYSICAL EXAM:  Vital Signs Last 24 Hrs  T(C): 36.6 (24 Mar 2024 22:26), Max: 36.6 (24 Mar 2024 12:35)  T(F): 97.9 (24 Mar 2024 22:26), Max: 97.9 (24 Mar 2024 22:26)  HR: 72 (24 Mar 2024 22:26) (68 - 72)  BP: 135/82 (24 Mar 2024 22:26) (119/71 - 135/82)  BP(mean): --  RR: 18 (24 Mar 2024 22:26) (18 - 18)  SpO2: 100% (24 Mar 2024 22:26) (100% - 100%)    Parameters below as of 24 Mar 2024 22:26  Patient On (Oxygen Delivery Method): room air    PHYSICAL EXAM:  GENERAL: Laying comfortably, NAD  HEENT: NCAT, PERRLA, EOMI, no scleral icterus, no LAD  LUNG: CTABL; No wheezes, crackles, or rhonchi  HEART: RRR; normal S1/S2; No murmurs, rubs, or gallops  ABDOMEN: +BS, soft, nontender, nondistended  EXTREMITIES:  +enlarged left thigh but unchanged in appearance compared to prior, no ecchymosis  NEUROLOGY: AOx3, non-focal, strength 5/5 in all extremities, sensation intact  PSYCH: calm and cooperative  SKIN: No rashes or lesions    LABS:  03-23    136  |  104  |  23  ----------------------------<  93  5.2   |  18<L>  |  0.47<L>    Ca    9.6      23 Mar 2024 10:05  Phos  4.6     03-23  Mg     2.10     03-23    TPro  7.4  /  Alb  2.8<L>  /  TBili  0.8  /  DBili  x   /  AST  27  /  ALT  18  /  AlkPhos  69  03-23    Urinalysis Basic - ( 23 Mar 2024 10:05 )    Color: x / Appearance: x / SG: x / pH: x  Gluc: 93 mg/dL / Ketone: x  / Bili: x / Urobili: x   Blood: x / Protein: x / Nitrite: x   Leuk Esterase: x / RBC: x / WBC x   Sq Epi: x / Non Sq Epi: x / Bacteria: x    A1C with Estimated Average Glucose Result: 5.3 % (03-15-24 @ 09:50)  A1C with Estimated Average Glucose Result: 5.2 % (03-15-24 @ 01:11)    MICROBIOLOGY:    PROGRESS NOTE:   Authored by Dr. Fady Deras MD (PGY-1)  Patient is a 58y old  Female who presents with a chief complaint of Hematoma (24 Mar 2024 11:01)    SUBJECTIVE / OVERNIGHT EVENTS:  No acute events overnight.   - No complaints in the AM. Denied fevers, chills, N/V, SOB, CP    MEDICATIONS  (STANDING):  acetaminophen     Tablet .. 650 milliGRAM(s) Oral every 6 hours  cholecalciferol 5000 Unit(s) Oral daily  dextrose 5%. 1000 milliLiter(s) (100 mL/Hr) IV Continuous <Continuous>  dextrose 5%. 1000 milliLiter(s) (50 mL/Hr) IV Continuous <Continuous>  dextrose 50% Injectable 25 Gram(s) IV Push once  dextrose 50% Injectable 12.5 Gram(s) IV Push once  dextrose 50% Injectable 25 Gram(s) IV Push once  gabapentin 300 milliGRAM(s) Oral three times a day  glucagon  Injectable 1 milliGRAM(s) IntraMuscular once  influenza   Vaccine 0.5 milliLiter(s) IntraMuscular once  insulin lispro (ADMELOG) corrective regimen sliding scale   SubCutaneous three times a day before meals  methocarbamol 750 milliGRAM(s) Oral three times a day  polyethylene glycol 3350 17 Gram(s) Oral daily  senna 2 Tablet(s) Oral at bedtime  traMADol 50 milliGRAM(s) Oral two times a day    MEDICATIONS  (PRN):  dextrose Oral Gel 15 Gram(s) Oral once PRN Blood Glucose LESS THAN 70 milliGRAM(s)/deciliter  saline laxative (FLEET) Rectal Enema 1 Enema Rectal daily PRN constipation, patient request   CAPILLARY BLOOD GLUCOSE    POCT Blood Glucose.: 127 mg/dL (24 Mar 2024 21:55)  POCT Blood Glucose.: 78 mg/dL (24 Mar 2024 17:56)  POCT Blood Glucose.: 104 mg/dL (24 Mar 2024 12:15)  POCT Blood Glucose.: 126 mg/dL (24 Mar 2024 08:30)   I&O's Summary    24 Mar 2024 07:01  -  25 Mar 2024 07:00  --------------------------------------------------------  IN: 480 mL / OUT: 300 mL / NET: 180 mL     PHYSICAL EXAM:  Vital Signs Last 24 Hrs  T(C): 36.6 (24 Mar 2024 22:26), Max: 36.6 (24 Mar 2024 12:35)  T(F): 97.9 (24 Mar 2024 22:26), Max: 97.9 (24 Mar 2024 22:26)  HR: 72 (24 Mar 2024 22:26) (68 - 72)  BP: 135/82 (24 Mar 2024 22:26) (119/71 - 135/82)  BP(mean): --  RR: 18 (24 Mar 2024 22:26) (18 - 18)  SpO2: 100% (24 Mar 2024 22:26) (100% - 100%)    Parameters below as of 24 Mar 2024 22:26  Patient On (Oxygen Delivery Method): room air    PHYSICAL EXAM:  GENERAL: Laying comfortably, NAD  HEENT: NCAT, PERRLA, EOMI, no scleral icterus, no LAD  LUNG: CTABL; No wheezes, crackles, or rhonchi  HEART: RRR; normal S1/S2; No murmurs, rubs, or gallops  ABDOMEN: +BS, soft, nontender, nondistended  EXTREMITIES:  +enlarged left thigh but unchanged in appearance compared to prior, no ecchymosis  NEUROLOGY: AOx3, non-focal, strength 5/5 in all extremities, sensation intact  PSYCH: calm and cooperative  SKIN: No rashes or lesions    LABS:  03-23    136  |  104  |  23  ----------------------------<  93  5.2   |  18<L>  |  0.47<L>    Ca    9.6      23 Mar 2024 10:05  Phos  4.6     03-23  Mg     2.10     03-23    TPro  7.4  /  Alb  2.8<L>  /  TBili  0.8  /  DBili  x   /  AST  27  /  ALT  18  /  AlkPhos  69  03-23    Urinalysis Basic - ( 23 Mar 2024 10:05 )    Color: x / Appearance: x / SG: x / pH: x  Gluc: 93 mg/dL / Ketone: x  / Bili: x / Urobili: x   Blood: x / Protein: x / Nitrite: x   Leuk Esterase: x / RBC: x / WBC x   Sq Epi: x / Non Sq Epi: x / Bacteria: x    A1C with Estimated Average Glucose Result: 5.3 % (03-15-24 @ 09:50)  A1C with Estimated Average Glucose Result: 5.2 % (03-15-24 @ 01:11)    MICROBIOLOGY:

## 2024-03-25 NOTE — PROGRESS NOTE ADULT - TIME BILLING
Time-based billing (NON-critical care).     More than 50% of the visit was spent counseling and / or coordinating care by the attending physician.      The necessity of the time spent during the encounter on this date of service was due to: documentation in [place], reviewing chart and coordinating care with patient/residents and interdisciplinary staff (such as , social workers, etc) as well as reviewing vitals, laboratory data, radiology, medication list, consultants' recommendations and prior records. Interventions were performed as documented above.
Time-based billing (NON-critical care).     More than 50% of the visit was spent counseling and / or coordinating care by the attending physician.      The necessity of the time spent during the encounter on this date of service was due to: documentation in [place], reviewing chart and coordinating care with patient/residents and interdisciplinary staff (such as , social workers, etc) as well as reviewing vitals, laboratory data, radiology, medication list, consultants' recommendations and prior records. Interventions were performed as documented above.
Time-based billing (NON-critical care).     More than 50% of the visit was spent counseling and / or coordinating care by the attending physician.      The necessity of the time spent during the encounter on this date of service was due to: documentation in [place], reviewing chart and coordinating care with patient/residents and interdisciplinary staff (such as , social workers, etc) as well as reviewing vitals, laboratory data, radiology, medication list, consultants' recommendations and prior records. Interventions were performed as documented above.

## 2024-03-25 NOTE — DISCHARGE NOTE NURSING/CASE MANAGEMENT/SOCIAL WORK - NSDCPEFALRISK_GEN_ALL_CORE
For information on Fall & Injury Prevention, visit: [email or web address] OR  [email or web address] OR  https://www.cdc.gov/steadi/patient.html
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
## 2024-03-25 NOTE — PROGRESS NOTE ADULT - PROBLEM SELECTOR PLAN 3
Bilat fem/pop DVT s/p thrombectomies and IVC filter placement  - AC stopped i/s/o hematoma  - high risk due to paraplegia Bilat fem/pop DVT s/p thrombectomies and IVC filter placement    Plan  - AC stopped i/s/o hematoma  - high risk due to paraplegia
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
## 2024-03-25 NOTE — PROGRESS NOTE ADULT - PROBLEM SELECTOR PLAN 6
DVT ppx: holding iso Hematoma  PT consult - Acute Rehab DVT ppx: holding iso Hematoma  PT consult - Home

## 2024-03-25 NOTE — PROGRESS NOTE ADULT - REASON FOR ADMISSION
Hematoma

## 2024-03-25 NOTE — DISCHARGE NOTE NURSING/CASE MANAGEMENT/SOCIAL WORK - PATIENT PORTAL LINK FT
You can access the FollowMyHealth Patient Portal offered by [facility] by registering at the following website: [email or web address]. By joining [employer]’s FollowMyHealth portal, you will also be able to view your health information using other applications (apps) compatible with our system.

## 2024-03-25 NOTE — PROGRESS NOTE ADULT - PROBLEM SELECTOR PROBLEM 3
History of DVT of lower extremity

## 2024-03-25 NOTE — PROGRESS NOTE ADULT - PROBLEM SELECTOR PROBLEM 4
Paraplegia
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
## 2024-03-25 NOTE — DISCHARGE NOTE NURSING/CASE MANAGEMENT/SOCIAL WORK - NSDCFUADDAPPT_GEN_ALL_CORE_FT
Please follow up with PCP about repeating a blood count, need for restarting anticoagulation, and diabetes

## 2024-03-25 NOTE — PROGRESS NOTE ADULT - PROVIDER SPECIALTY LIST ADULT
Internal Medicine
Rehab Medicine
Rehab Medicine
Internal Medicine
Rehab Medicine
Internal Medicine

## 2024-03-29 ENCOUNTER — APPOINTMENT (OUTPATIENT)
Dept: CARE COORDINATION | Facility: HOME HEALTH | Age: 59
End: 2024-03-29
Payer: COMMERCIAL

## 2024-03-29 DIAGNOSIS — I10 ESSENTIAL (PRIMARY) HYPERTENSION: ICD-10-CM

## 2024-03-29 DIAGNOSIS — E55.9 VITAMIN D DEFICIENCY, UNSPECIFIED: ICD-10-CM

## 2024-03-29 DIAGNOSIS — K59.09 OTHER CONSTIPATION: ICD-10-CM

## 2024-03-29 DIAGNOSIS — G89.29 OTHER CHRONIC PAIN: ICD-10-CM

## 2024-03-29 DIAGNOSIS — M62.838 OTHER MUSCLE SPASM: ICD-10-CM

## 2024-03-29 DIAGNOSIS — Z78.9 OTHER SPECIFIED HEALTH STATUS: ICD-10-CM

## 2024-03-29 DIAGNOSIS — T14.8XXA OTHER INJURY OF UNSPECIFIED BODY REGION, INITIAL ENCOUNTER: ICD-10-CM

## 2024-03-29 PROBLEM — Z00.00 ENCOUNTER FOR PREVENTIVE HEALTH EXAMINATION: Status: ACTIVE | Noted: 2024-03-29

## 2024-03-29 PROCEDURE — 99349 HOME/RES VST EST MOD MDM 40: CPT

## 2024-03-29 RX ORDER — SENNOSIDES 8.6 MG TABLETS 8.6 MG/1
8.6 TABLET ORAL
Qty: 60 | Refills: 0 | Status: ACTIVE | COMMUNITY
Start: 2024-03-29 | End: 1900-01-01

## 2024-03-29 RX ORDER — NEBULIZER AND COMPRESSOR
EACH MISCELLANEOUS
Qty: 1 | Refills: 0 | Status: ACTIVE | COMMUNITY
Start: 2024-03-29 | End: 1900-01-01

## 2024-03-29 RX ORDER — COLD-HOT PACK
125 MCG EACH MISCELLANEOUS DAILY
Qty: 30 | Refills: 0 | Status: ACTIVE | COMMUNITY
Start: 2024-03-29 | End: 1900-01-01

## 2024-03-29 RX ORDER — POLYETHYLENE GLYCOL 3350 17 G/17G
17 POWDER, FOR SOLUTION ORAL
Qty: 510 | Refills: 0 | Status: ACTIVE | COMMUNITY
Start: 2024-03-29 | End: 1900-01-01

## 2024-03-29 RX ORDER — METHOCARBAMOL 750 MG/1
750 TABLET, FILM COATED ORAL 3 TIMES DAILY
Qty: 90 | Refills: 0 | Status: ACTIVE | COMMUNITY
Start: 2024-03-29 | End: 1900-01-01

## 2024-03-29 RX ORDER — GABAPENTIN 300 MG/1
300 CAPSULE ORAL 3 TIMES DAILY
Qty: 90 | Refills: 0 | Status: ACTIVE | COMMUNITY
Start: 2024-03-29 | End: 1900-01-01

## 2024-03-29 RX ORDER — TRAMADOL HYDROCHLORIDE 50 MG/1
50 TABLET, COATED ORAL TWICE DAILY
Qty: 30 | Refills: 0 | Status: ACTIVE | COMMUNITY
Start: 2024-03-29 | End: 1900-01-01

## 2024-04-01 PROBLEM — K59.09 CHRONIC CONSTIPATION: Status: ACTIVE | Noted: 2024-03-29

## 2024-04-01 NOTE — REVIEW OF SYSTEMS
[Fatigue] : fatigue [Constipation] : constipation [Incontinence] : incontinence [Muscle Pain] : muscle pain [Muscle Weakness] : muscle weakness [Negative] : Heme/Lymph [Fever] : no fever [Chills] : no chills [Vision Problems] : no vision problems [Hearing Loss] : no hearing loss [Chest Pain] : no chest pain [Shortness Of Breath] : no shortness of breath [Cough] : no cough [Abdominal Pain] : no abdominal pain [Nausea] : no nausea [Diarrhea] : diarrhea [Vomiting] : no vomiting [Joint Pain] : no joint pain [Headache] : no headache [Skin Rash] : no skin rash [Dizziness] : no dizziness [Insomnia] : no insomnia [Memory Loss] : no memory loss [de-identified] : paraplegia

## 2024-04-01 NOTE — COUNSELING
[Fall prevention counseling provided] : Fall prevention counseling provided [None] : None [FreeTextEntry1] : uses ascencion lift to transfer

## 2024-04-01 NOTE — PHYSICAL EXAM
[No Acute Distress] : no acute distress [Normal Sclera/Conjunctiva] : normal sclera/conjunctiva [Well Nourished] : well nourished [Normal Outer Ear/Nose] : the outer ears and nose were normal in appearance [No Respiratory Distress] : no respiratory distress  [No Accessory Muscle Use] : no accessory muscle use [No Edema] : there was no peripheral edema [Normal] : normal gait, coordination grossly intact, no focal deficits and deep tendon reflexes were 2+ and symmetric [de-identified] : paraplegia to lower extremities [de-identified] : sacral ulcer

## 2024-04-01 NOTE — PLAN
[FreeTextEntry1] : 1. cont all medications as prescribe 2. maintain adequate nutrition and hydration  3. keep all f/u appts 4. cont wound care as prescribed - turn and position  5. cont bowel regimen
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
## 2024-04-01 NOTE — CURRENT MEDS
[Takes medication as prescribed] : takes [None] : Patient does not have any barriers to medication adherence [Yes] : Reviewed medication list for presence of high-risk medications. [Opioids] : opioids [Muscle Relaxants] : muscle relaxants [FreeTextEntry1] : Tramadol and Metocarbamol
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
